# Patient Record
Sex: FEMALE | Race: WHITE | Employment: OTHER | URBAN - METROPOLITAN AREA
[De-identification: names, ages, dates, MRNs, and addresses within clinical notes are randomized per-mention and may not be internally consistent; named-entity substitution may affect disease eponyms.]

---

## 2017-03-10 RX ORDER — ZOLPIDEM TARTRATE 5 MG/1
TABLET ORAL
Qty: 90 TABLET | Refills: 0 | Status: SHIPPED | COMMUNITY
Start: 2017-03-10 | End: 2017-06-10

## 2017-06-01 RX ORDER — VALSARTAN AND HYDROCHLOROTHIAZIDE 160; 25 MG/1; MG/1
TABLET ORAL
Qty: 90 TABLET | Refills: 0 | Status: SHIPPED | OUTPATIENT
Start: 2017-06-01 | End: 2017-08-26

## 2017-06-12 RX ORDER — ZOLPIDEM TARTRATE 5 MG/1
TABLET ORAL
Qty: 90 TABLET | Refills: 0 | Status: SHIPPED | COMMUNITY
Start: 2017-06-12 | End: 2017-09-12

## 2017-07-21 ENCOUNTER — OFFICE VISIT (OUTPATIENT)
Dept: FAMILY MEDICINE CLINIC | Facility: CLINIC | Age: 77
End: 2017-07-21

## 2017-07-21 VITALS
SYSTOLIC BLOOD PRESSURE: 136 MMHG | RESPIRATION RATE: 16 BRPM | TEMPERATURE: 98 F | BODY MASS INDEX: 26.38 KG/M2 | OXYGEN SATURATION: 97 % | DIASTOLIC BLOOD PRESSURE: 74 MMHG | HEART RATE: 75 BPM | HEIGHT: 60 IN | WEIGHT: 134.38 LBS

## 2017-07-21 DIAGNOSIS — I10 ESSENTIAL HYPERTENSION: Primary | ICD-10-CM

## 2017-07-21 DIAGNOSIS — E55.9 VITAMIN D DEFICIENCY: ICD-10-CM

## 2017-07-21 PROCEDURE — 99213 OFFICE O/P EST LOW 20 MIN: CPT | Performed by: FAMILY MEDICINE

## 2017-07-21 RX ORDER — CHOLECALCIFEROL (VITAMIN D3) 50 MCG
TABLET ORAL
COMMUNITY
End: 2019-11-05

## 2017-07-21 NOTE — PROGRESS NOTES
Recently while at her GI specialist office she was told that her blood pressure was elevated.   She has known hypertension usually well-controlled and well-tolerated with losartan HCTZ 100/25 of pressure they discovered at this office was 137/78 and today's

## 2017-07-24 ENCOUNTER — PATIENT MESSAGE (OUTPATIENT)
Dept: FAMILY MEDICINE CLINIC | Facility: CLINIC | Age: 77
End: 2017-07-24

## 2017-07-24 NOTE — TELEPHONE ENCOUNTER
From: Angeles Griffith  To: Viviana Melendez DO  Sent: 7/24/2017 10:59 AM CDT  Subject: Non-Urgent Medical Question    When did I have my last a tetanus shot. I can't find the info in this new My Chart.  How long does the shot last?

## 2017-07-25 ENCOUNTER — LAB ENCOUNTER (OUTPATIENT)
Dept: LAB | Age: 77
End: 2017-07-25
Attending: FAMILY MEDICINE
Payer: MEDICARE

## 2017-07-25 DIAGNOSIS — E55.9 VITAMIN D DEFICIENCY: ICD-10-CM

## 2017-07-25 DIAGNOSIS — I10 ESSENTIAL HYPERTENSION: ICD-10-CM

## 2017-07-25 LAB
25-HYDROXYVITAMIN D (TOTAL): 107.8 NG/ML (ref 30–100)
ALBUMIN SERPL-MCNC: 3.6 G/DL (ref 3.5–4.8)
ALP LIVER SERPL-CCNC: 114 U/L (ref 55–142)
ALT SERPL-CCNC: 34 U/L (ref 14–54)
AST SERPL-CCNC: 26 U/L (ref 15–41)
BASOPHILS # BLD AUTO: 0.05 X10(3) UL (ref 0–0.1)
BASOPHILS NFR BLD AUTO: 0.6 %
BILIRUB SERPL-MCNC: 0.5 MG/DL (ref 0.1–2)
BUN BLD-MCNC: 24 MG/DL (ref 8–20)
CALCIUM BLD-MCNC: 9.3 MG/DL (ref 8.3–10.3)
CHLORIDE: 103 MMOL/L (ref 101–111)
CHOLEST SMN-MCNC: 282 MG/DL (ref ?–200)
CO2: 30 MMOL/L (ref 22–32)
CREAT BLD-MCNC: 1.06 MG/DL (ref 0.55–1.02)
EOSINOPHIL # BLD AUTO: 0.28 X10(3) UL (ref 0–0.3)
EOSINOPHIL NFR BLD AUTO: 3.6 %
ERYTHROCYTE [DISTWIDTH] IN BLOOD BY AUTOMATED COUNT: 13.5 % (ref 11.5–16)
GLUCOSE BLD-MCNC: 87 MG/DL (ref 70–99)
HCT VFR BLD AUTO: 39.7 % (ref 34–50)
HDLC SERPL-MCNC: 109 MG/DL (ref 45–?)
HDLC SERPL: 2.59 {RATIO} (ref ?–4.44)
HGB BLD-MCNC: 13.2 G/DL (ref 12–16)
IMMATURE GRANULOCYTE COUNT: 0.02 X10(3) UL (ref 0–1)
IMMATURE GRANULOCYTE RATIO %: 0.3 %
LDLC SERPL CALC-MCNC: 150 MG/DL (ref ?–130)
LDLC SERPL-MCNC: 23 MG/DL (ref 5–40)
LYMPHOCYTES # BLD AUTO: 3.22 X10(3) UL (ref 0.9–4)
LYMPHOCYTES NFR BLD AUTO: 40.9 %
M PROTEIN MFR SERPL ELPH: 7.6 G/DL (ref 6.1–8.3)
MCH RBC QN AUTO: 30.1 PG (ref 27–33.2)
MCHC RBC AUTO-ENTMCNC: 33.2 G/DL (ref 31–37)
MCV RBC AUTO: 90.6 FL (ref 81–100)
MONOCYTES # BLD AUTO: 0.64 X10(3) UL (ref 0.1–0.6)
MONOCYTES NFR BLD AUTO: 8.1 %
NEUTROPHIL ABS PRELIM: 3.67 X10 (3) UL (ref 1.3–6.7)
NEUTROPHILS # BLD AUTO: 3.67 X10(3) UL (ref 1.3–6.7)
NEUTROPHILS NFR BLD AUTO: 46.5 %
NONHDLC SERPL-MCNC: 173 MG/DL (ref ?–130)
PLATELET # BLD AUTO: 218 10(3)UL (ref 150–450)
POTASSIUM SERPL-SCNC: 3.6 MMOL/L (ref 3.6–5.1)
RBC # BLD AUTO: 4.38 X10(6)UL (ref 3.8–5.1)
RED CELL DISTRIBUTION WIDTH-SD: 45.1 FL (ref 35.1–46.3)
SODIUM SERPL-SCNC: 139 MMOL/L (ref 136–144)
TRIGLYCERIDES: 115 MG/DL (ref ?–150)
TSI SER-ACNC: 3.17 MIU/ML (ref 0.35–5.5)
WBC # BLD AUTO: 7.9 X10(3) UL (ref 4–13)

## 2017-07-25 PROCEDURE — 80053 COMPREHEN METABOLIC PANEL: CPT

## 2017-07-25 PROCEDURE — 85025 COMPLETE CBC W/AUTO DIFF WBC: CPT

## 2017-07-25 PROCEDURE — 82306 VITAMIN D 25 HYDROXY: CPT

## 2017-07-25 PROCEDURE — 36415 COLL VENOUS BLD VENIPUNCTURE: CPT

## 2017-07-25 PROCEDURE — 84443 ASSAY THYROID STIM HORMONE: CPT

## 2017-07-25 PROCEDURE — 80061 LIPID PANEL: CPT

## 2017-08-28 RX ORDER — VALSARTAN AND HYDROCHLOROTHIAZIDE 160; 25 MG/1; MG/1
TABLET ORAL
Qty: 90 TABLET | Refills: 0 | Status: SHIPPED | OUTPATIENT
Start: 2017-08-28 | End: 2017-09-12

## 2017-09-11 RX ORDER — ZOLPIDEM TARTRATE 5 MG/1
TABLET ORAL
Qty: 90 TABLET | Refills: 0 | Status: CANCELLED | COMMUNITY
Start: 2017-09-11

## 2017-09-12 ENCOUNTER — OFFICE VISIT (OUTPATIENT)
Dept: FAMILY MEDICINE CLINIC | Facility: CLINIC | Age: 77
End: 2017-09-12

## 2017-09-12 VITALS
HEIGHT: 60 IN | BODY MASS INDEX: 26.31 KG/M2 | RESPIRATION RATE: 20 BRPM | SYSTOLIC BLOOD PRESSURE: 138 MMHG | DIASTOLIC BLOOD PRESSURE: 80 MMHG | HEART RATE: 82 BPM | WEIGHT: 134 LBS | TEMPERATURE: 99 F

## 2017-09-12 DIAGNOSIS — R42 VERTIGO: ICD-10-CM

## 2017-09-12 DIAGNOSIS — G47.09 OTHER INSOMNIA: ICD-10-CM

## 2017-09-12 DIAGNOSIS — I10 ESSENTIAL HYPERTENSION: ICD-10-CM

## 2017-09-12 DIAGNOSIS — E56.9 VITAMIN DEFICIENCY: ICD-10-CM

## 2017-09-12 DIAGNOSIS — M81.8 OTHER OSTEOPOROSIS WITHOUT CURRENT PATHOLOGICAL FRACTURE: ICD-10-CM

## 2017-09-12 DIAGNOSIS — E78.00 ELEVATED CHOLESTEROL: ICD-10-CM

## 2017-09-12 DIAGNOSIS — Z13.31 DEPRESSION SCREENING: ICD-10-CM

## 2017-09-12 DIAGNOSIS — K21.00 GASTROESOPHAGEAL REFLUX DISEASE WITH ESOPHAGITIS: ICD-10-CM

## 2017-09-12 DIAGNOSIS — Z85.828 HISTORY OF SKIN CANCER: ICD-10-CM

## 2017-09-12 DIAGNOSIS — L71.9 ROSACEA: ICD-10-CM

## 2017-09-12 DIAGNOSIS — B00.9 HSV INFECTION: ICD-10-CM

## 2017-09-12 DIAGNOSIS — Z00.00 ENCOUNTER FOR ANNUAL HEALTH EXAMINATION: Primary | ICD-10-CM

## 2017-09-12 DIAGNOSIS — E55.9 VITAMIN D DEFICIENCY: ICD-10-CM

## 2017-09-12 DIAGNOSIS — G89.29 CHRONIC BACK PAIN, UNSPECIFIED BACK LOCATION, UNSPECIFIED BACK PAIN LATERALITY: ICD-10-CM

## 2017-09-12 DIAGNOSIS — G89.29 CHRONIC RIGHT SHOULDER PAIN: ICD-10-CM

## 2017-09-12 DIAGNOSIS — M25.511 CHRONIC RIGHT SHOULDER PAIN: ICD-10-CM

## 2017-09-12 DIAGNOSIS — M54.9 CHRONIC BACK PAIN, UNSPECIFIED BACK LOCATION, UNSPECIFIED BACK PAIN LATERALITY: ICD-10-CM

## 2017-09-12 PROCEDURE — G0444 DEPRESSION SCREEN ANNUAL: HCPCS | Performed by: FAMILY MEDICINE

## 2017-09-12 PROCEDURE — G0439 PPPS, SUBSEQ VISIT: HCPCS | Performed by: FAMILY MEDICINE

## 2017-09-12 PROCEDURE — 99213 OFFICE O/P EST LOW 20 MIN: CPT | Performed by: FAMILY MEDICINE

## 2017-09-12 RX ORDER — MAGNESIUM OXIDE 400 MG (241.3 MG MAGNESIUM) TABLET
400 TABLET
COMMUNITY

## 2017-09-12 RX ORDER — ZOLPIDEM TARTRATE 5 MG/1
TABLET ORAL
Qty: 90 TABLET | Refills: 3 | Status: SHIPPED | OUTPATIENT
Start: 2017-09-12 | End: 2018-06-09

## 2017-09-12 RX ORDER — VALSARTAN AND HYDROCHLOROTHIAZIDE 160; 25 MG/1; MG/1
1 TABLET ORAL DAILY
Qty: 90 TABLET | Refills: 3 | Status: SHIPPED | OUTPATIENT
Start: 2017-09-12 | End: 2018-09-14

## 2017-09-12 NOTE — PROGRESS NOTES
HPI:   Johnnie Ventura is a 68year old female who presents for a Medicare Subsequent Annual Wellness visit (Pt already had Initial Annual Wellness).     Pt also here to follow up on insomnia, HTN, rosacea   Annual Physical due on 09/14/2017        Quin Peres ONE TABLET BY MOUTH AT BEDTIME AS NEEDED FOR SLEEP   Cholecalciferol (VITAMIN D) 2000 units Oral Tab Take by mouth. MedroxyPROGESTERone Acetate (PROVERA) 2.5 MG Oral Tab Take one tablet by mouth daily. Calcium 500-125 MG-UNIT Oral Tab Take  by mouth. anxiety  HEMATOLOGIC: denies hx of anemia  ENDOCRINE: denies thyroid history  ALL/ASTHMA: denies asthma    EXAM:   /80   Pulse 82   Temp 98.8 °F (37.1 °C) (Oral)   Resp 20   Ht 60\"   Wt 134 lb   LMP 11/10/2013   BMI 26.17 kg/m²  Estimated body mass Immunization History   Administered Date(s) Administered   • Depo-Medrol 40mg Inj 04/07/2014   • TD 05/08/2013       ASSESSMENT AND OTHER RELEVANT CHRONIC CONDITIONS:   Gold Reyes is a 68year old female who presents for a Medicare Assessment. therapy.    Veryl Corson is unable to use daily aspirin therapy For the following reasons:   Patient decided that the risks of aspirin therapy outweigh the benefits and declines aspirin therapy            Diet assessment: good     Advanced Directive:  L No    Problems with daily activities? : No    Memory Problems?: No      Fall/Risk Assessment     Do you have 3 or more medical conditions?: 0-No    Have you fallen in the last 12 months?: 0-No    Do you accidently lose urine?: 0-No    Do you have difficult Cholesterol (mg/dL)   Date Value   07/25/2017 150 (H)        EKG - w/ Initial Preventative Physical Exam only, or if medically necessary Electrocardiogram date05/05/2015       Colorectal Cancer Screening      Colonoscopy Screen every 10 years There are no Illicit injectable drug abusers     Tetanus Toxoid  Only covered with a cut with metal- TD and TDaP Not covered by Medicare Part B 05/08/2013 This may be covered with your prescription benefits, but Medicare does not cover unless Medically needed    Zost

## 2017-09-12 NOTE — PATIENT INSTRUCTIONS
Mode Azevedo's SCREENING SCHEDULE   Tests on this list are recommended by your physician but may not be covered, or covered at this frequency, by your insurer. Please check with your insurance carrier before scheduling to verify coverage.    NATY Abdominal aortic aneurysm screening (once between ages 73-68) IPPE only No results found for this or any previous visit.  Limited to patients who meet one of the following criteria:   • Men who are 73-68 years old and have smoked more than 100 cigarettes Mammogram    Recommend Annually to at least age 76, and as needed after 76 Mammogram,1 Yr due on 09/20/2017 Please get this Mammogram regularly   Immunizations      Influenza  Covered Annually No orders found for this or any previous visit.  Please get [place])  [email or web address]  This link also has information from the [address] [place] [address] regarding Advance Directives.

## 2017-12-22 ENCOUNTER — OFFICE VISIT (OUTPATIENT)
Dept: FAMILY MEDICINE CLINIC | Facility: CLINIC | Age: 77
End: 2017-12-22

## 2017-12-22 VITALS
RESPIRATION RATE: 14 BRPM | DIASTOLIC BLOOD PRESSURE: 78 MMHG | SYSTOLIC BLOOD PRESSURE: 116 MMHG | TEMPERATURE: 98 F | OXYGEN SATURATION: 98 % | HEART RATE: 76 BPM

## 2017-12-22 DIAGNOSIS — R05.9 COUGH: Primary | ICD-10-CM

## 2017-12-22 DIAGNOSIS — J02.9 SORE THROAT: ICD-10-CM

## 2017-12-22 PROCEDURE — 99213 OFFICE O/P EST LOW 20 MIN: CPT | Performed by: FAMILY MEDICINE

## 2017-12-22 RX ORDER — AZITHROMYCIN 250 MG/1
TABLET, FILM COATED ORAL
Qty: 6 TABLET | Refills: 0 | Status: SHIPPED | OUTPATIENT
Start: 2017-12-22 | End: 2018-09-14

## 2017-12-22 NOTE — PROGRESS NOTES
HPI:    Patient ID: Phil Spencer is a 68year old female. Patient presents with:  Cough      HPI  Patient is here for sore throat and cough for 4 days. States cough is productive of Yellowish sputum. Has chest tightness. No difficulty breathing.  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   Nose: Right sinus exhibits no maxillary sinus tenderness and no frontal sinus tenderness. Left sinus exhibits no maxillary sinus tenderness and no frontal sinus tenderness.    Mouth/Throat: Oropharynx is clear and moist.

## 2018-01-19 ENCOUNTER — APPOINTMENT (OUTPATIENT)
Dept: LAB | Age: 78
End: 2018-01-19
Attending: FAMILY MEDICINE
Payer: MEDICARE

## 2018-01-19 DIAGNOSIS — Z12.11 SCREEN FOR COLON CANCER: ICD-10-CM

## 2018-01-19 DIAGNOSIS — K59.00 CONSTIPATION, UNSPECIFIED CONSTIPATION TYPE: ICD-10-CM

## 2018-01-19 DIAGNOSIS — E78.00 ELEVATED CHOLESTEROL: ICD-10-CM

## 2018-01-19 DIAGNOSIS — E55.9 VITAMIN D DEFICIENCY: ICD-10-CM

## 2018-01-19 LAB
25-HYDROXYVITAMIN D (TOTAL): 59.2 NG/ML (ref 30–100)
ALBUMIN SERPL-MCNC: 3.4 G/DL (ref 3.5–4.8)
ALP LIVER SERPL-CCNC: 140 U/L (ref 55–142)
ALT SERPL-CCNC: 33 U/L (ref 14–54)
AST SERPL-CCNC: 25 U/L (ref 15–41)
BILIRUB SERPL-MCNC: 0.5 MG/DL (ref 0.1–2)
BUN BLD-MCNC: 20 MG/DL (ref 8–20)
CALCIUM BLD-MCNC: 8.8 MG/DL (ref 8.3–10.3)
CHLORIDE: 104 MMOL/L (ref 101–111)
CHOLEST SMN-MCNC: 236 MG/DL (ref ?–200)
CO2: 31 MMOL/L (ref 22–32)
CREAT BLD-MCNC: 0.92 MG/DL (ref 0.55–1.02)
GLUCOSE BLD-MCNC: 86 MG/DL (ref 70–99)
HDLC SERPL-MCNC: 90 MG/DL (ref 45–?)
HDLC SERPL: 2.62 {RATIO} (ref ?–4.44)
LDLC SERPL CALC-MCNC: 115 MG/DL (ref ?–130)
M PROTEIN MFR SERPL ELPH: 8 G/DL (ref 6.1–8.3)
NONHDLC SERPL-MCNC: 146 MG/DL (ref ?–130)
POTASSIUM SERPL-SCNC: 4.3 MMOL/L (ref 3.6–5.1)
SODIUM SERPL-SCNC: 139 MMOL/L (ref 136–144)
TRIGL SERPL-MCNC: 153 MG/DL (ref ?–150)
VLDLC SERPL CALC-MCNC: 31 MG/DL (ref 5–40)

## 2018-01-19 PROCEDURE — 84439 ASSAY OF FREE THYROXINE: CPT

## 2018-01-19 PROCEDURE — 80061 LIPID PANEL: CPT

## 2018-01-19 PROCEDURE — 36415 COLL VENOUS BLD VENIPUNCTURE: CPT

## 2018-01-19 PROCEDURE — 84443 ASSAY THYROID STIM HORMONE: CPT

## 2018-01-19 PROCEDURE — 82306 VITAMIN D 25 HYDROXY: CPT

## 2018-01-19 PROCEDURE — 80053 COMPREHEN METABOLIC PANEL: CPT

## 2018-01-22 LAB
FREE T4: 1.1 NG/DL (ref 0.9–1.8)
TSI SER-ACNC: 2.78 MIU/ML (ref 0.35–5.5)

## 2018-03-05 ENCOUNTER — HOSPITAL ENCOUNTER (OUTPATIENT)
Dept: MAMMOGRAPHY | Age: 78
Discharge: HOME | End: 2018-03-05
Attending: OBSTETRICS & GYNECOLOGY
Payer: MEDICARE

## 2018-03-05 DIAGNOSIS — Z12.31 ENCOUNTER FOR SCREENING MAMMOGRAM FOR MALIGNANT NEOPLASM OF BREAST: ICD-10-CM

## 2018-03-05 PROCEDURE — 77067 SCR MAMMO BI INCL CAD: CPT | Performed by: OBSTETRICS & GYNECOLOGY

## 2018-06-05 ENCOUNTER — PATIENT OUTREACH (OUTPATIENT)
Dept: FAMILY MEDICINE CLINIC | Facility: CLINIC | Age: 78
End: 2018-06-05

## 2018-06-09 DIAGNOSIS — G47.09 OTHER INSOMNIA: ICD-10-CM

## 2018-06-11 RX ORDER — ZOLPIDEM TARTRATE 5 MG/1
TABLET ORAL
Qty: 90 TABLET | Refills: 0 | Status: SHIPPED | OUTPATIENT
Start: 2018-06-11 | End: 2018-09-14

## 2018-06-12 ENCOUNTER — PATIENT MESSAGE (OUTPATIENT)
Dept: FAMILY MEDICINE CLINIC | Facility: CLINIC | Age: 78
End: 2018-06-12

## 2018-06-12 NOTE — TELEPHONE ENCOUNTER
From: Ricki Moya  To: Jese Leon DO  Sent: 6/12/2018 11:17 AM CDT  Subject: Other    I will request an appointment for my annual checkup in July as my annual is due after September 12, 2018.    Thank you for the reminder  Jermaine Molina

## 2018-07-24 ENCOUNTER — PATIENT OUTREACH (OUTPATIENT)
Dept: FAMILY MEDICINE CLINIC | Facility: CLINIC | Age: 78
End: 2018-07-24

## 2018-09-01 ENCOUNTER — TELEPHONE (OUTPATIENT)
Dept: FAMILY MEDICINE CLINIC | Facility: CLINIC | Age: 78
End: 2018-09-01

## 2018-09-01 RX ORDER — LOSARTAN POTASSIUM 50 MG/1
50 TABLET ORAL DAILY
Qty: 30 TABLET | Refills: 0 | Status: SHIPPED | OUTPATIENT
Start: 2018-09-01 | End: 2018-09-14

## 2018-09-01 NOTE — TELEPHONE ENCOUNTER
Pt went to pharmacy to  losartan and was advised it was on recall. She wants to know what she should do?

## 2018-09-01 NOTE — TELEPHONE ENCOUNTER
Per Dr.R aSms, plain Losartan 50 mg daily has been  Ordered   Task is done. Patient has been informed of change.

## 2018-09-01 NOTE — TELEPHONE ENCOUNTER
Patient took her last Valsartan 160/25 mg this morning    She is now worried as pharmacy has informed her of recall. Please advise    Thank you. Cr Linder

## 2018-09-04 RX ORDER — HYDROCHLOROTHIAZIDE 25 MG/1
25 TABLET ORAL DAILY
Qty: 30 TABLET | Refills: 1 | Status: SHIPPED | OUTPATIENT
Start: 2018-09-04 | End: 2018-09-14

## 2018-09-14 ENCOUNTER — OFFICE VISIT (OUTPATIENT)
Dept: FAMILY MEDICINE CLINIC | Facility: CLINIC | Age: 78
End: 2018-09-14
Payer: MEDICARE

## 2018-09-14 VITALS
RESPIRATION RATE: 16 BRPM | DIASTOLIC BLOOD PRESSURE: 74 MMHG | TEMPERATURE: 98 F | BODY MASS INDEX: 26.7 KG/M2 | HEIGHT: 60 IN | HEART RATE: 76 BPM | SYSTOLIC BLOOD PRESSURE: 136 MMHG | WEIGHT: 136 LBS

## 2018-09-14 DIAGNOSIS — B00.9 HSV INFECTION: ICD-10-CM

## 2018-09-14 DIAGNOSIS — E53.8 VITAMIN B12 DEFICIENCY: ICD-10-CM

## 2018-09-14 DIAGNOSIS — G89.29 CHRONIC LOW BACK PAIN WITH SCIATICA, SCIATICA LATERALITY UNSPECIFIED, UNSPECIFIED BACK PAIN LATERALITY: ICD-10-CM

## 2018-09-14 DIAGNOSIS — E56.9 VITAMIN DEFICIENCY: ICD-10-CM

## 2018-09-14 DIAGNOSIS — Z00.00 ENCOUNTER FOR ANNUAL HEALTH EXAMINATION: Primary | ICD-10-CM

## 2018-09-14 DIAGNOSIS — G47.09 OTHER INSOMNIA: ICD-10-CM

## 2018-09-14 DIAGNOSIS — L71.9 ROSACEA: ICD-10-CM

## 2018-09-14 DIAGNOSIS — K83.01 PRIMARY SCLEROSING CHOLANGITIS: ICD-10-CM

## 2018-09-14 DIAGNOSIS — M54.40 CHRONIC LOW BACK PAIN WITH SCIATICA, SCIATICA LATERALITY UNSPECIFIED, UNSPECIFIED BACK PAIN LATERALITY: ICD-10-CM

## 2018-09-14 DIAGNOSIS — K21.00 GASTROESOPHAGEAL REFLUX DISEASE WITH ESOPHAGITIS: ICD-10-CM

## 2018-09-14 DIAGNOSIS — Z85.828 HISTORY OF SKIN CANCER: ICD-10-CM

## 2018-09-14 DIAGNOSIS — E78.00 ELEVATED CHOLESTEROL: ICD-10-CM

## 2018-09-14 DIAGNOSIS — E55.9 VITAMIN D DEFICIENCY: ICD-10-CM

## 2018-09-14 DIAGNOSIS — Z13.820 SCREENING FOR OSTEOPOROSIS: ICD-10-CM

## 2018-09-14 DIAGNOSIS — Z13.31 DEPRESSION SCREENING: ICD-10-CM

## 2018-09-14 DIAGNOSIS — M81.8 OTHER OSTEOPOROSIS WITHOUT CURRENT PATHOLOGICAL FRACTURE: ICD-10-CM

## 2018-09-14 DIAGNOSIS — Z78.0 ASYMPTOMATIC MENOPAUSE: ICD-10-CM

## 2018-09-14 DIAGNOSIS — I10 ESSENTIAL HYPERTENSION: ICD-10-CM

## 2018-09-14 PROCEDURE — 99213 OFFICE O/P EST LOW 20 MIN: CPT | Performed by: FAMILY MEDICINE

## 2018-09-14 PROCEDURE — G0439 PPPS, SUBSEQ VISIT: HCPCS | Performed by: FAMILY MEDICINE

## 2018-09-14 PROCEDURE — G0444 DEPRESSION SCREEN ANNUAL: HCPCS | Performed by: FAMILY MEDICINE

## 2018-09-14 RX ORDER — LOSARTAN POTASSIUM 50 MG/1
50 TABLET ORAL DAILY
Qty: 90 TABLET | Refills: 3 | Status: SHIPPED | OUTPATIENT
Start: 2018-09-14 | End: 2019-04-04

## 2018-09-14 RX ORDER — HYDROCHLOROTHIAZIDE 25 MG/1
25 TABLET ORAL DAILY
Qty: 90 TABLET | Refills: 3 | Status: SHIPPED | OUTPATIENT
Start: 2018-09-14 | End: 2019-09-07

## 2018-09-14 RX ORDER — ZOLPIDEM TARTRATE 5 MG/1
TABLET ORAL
Qty: 90 TABLET | Refills: 3 | Status: SHIPPED | OUTPATIENT
Start: 2018-09-14 | End: 2019-03-25

## 2018-09-14 NOTE — PATIENT INSTRUCTIONS
Beto Azevedo's SCREENING SCHEDULE   Tests on this list are recommended by your physician but may not be covered, or covered at this frequency, by your insurer. Please check with your insurance carrier before scheduling to verify coverage.    NATY screening (once between ages 73-68) IPPE only No results found for this or any previous visit.  Limited to patients who meet one of the following criteria:   • Men who are 73-68 years old and have smoked more than 100 cigarettes in their lifetime   • Anyone Annually to at least age 76, and as needed after 76 There are no preventive care reminders to display for this patient. Please get this Mammogram regularly   Immunizations      Influenza  Covered Annually No orders found for this or any previous visit.  Ple available in [address])  [email or web address]  This link also has information from the [address] [place] [address] regarding Advance Directives.

## 2018-09-14 NOTE — PROGRESS NOTES
HPI:   Kim Fitzgerald is a 66year old female who presents for a Medicare Subsequent Annual Wellness visit (Pt already had Initial Annual Wellness).     Pt also here to follow up on   Rosacea     Vitamin deficiency     Essential hypertension     HSV inf (if present), and forms available to patient in AVS         She has never smoked tobacco.    CAGE Alcohol screening   Daniella Reeves was screened for Alcohol abuse and had a score of 0 so is at low risk.     Patient Care Team: Patient Care Team:  Verona Coronado, tablet (25 mg total) by mouth daily. Zolpidem Tartrate 5 MG Oral Tab TAKE ONE TABLET BY MOUTH AT BEDTIME AS NEEDED FOR SLEEP   Estradiol 0.025 MG/24HR Transdermal Patch Biweekly Place 1 patch onto the skin twice a week.    Progesterone Micronized 100 MG O exertion  CARDIOVASCULAR: denies chest pain on exertion  GI: denies abdominal pain, denies heartburn  : denies dysuria, vaginal discharge or itching, no complaint of urinary incontinence   MUSCULOSKELETAL: denies back pain  NEURO: denies headaches  PSYCH cyanosis or edema   Pulses: 2+ and symmetric   Skin: Skin color, texture, turgor normal, no rashes or lesions   Lymph nodes: Cervical, supraclavicular, and axillary nodes normal   Neurologic: Normal       Vaccination History     Immunization History   Admi III    Rosacea- stable monitor   -     OFFICE/OUTPT VISIT,EST,LEVL III    Vitamin deficiency - labs due   -     VITAMIN B12; Future  -     VITAMIN D, 25-HYDROXY; Future  -     OFFICE/OUTPT VISIT,EST,LEVL III    Primary sclerosing cholangitis- sees gi regul Value   04/05/2011 116     LDL CHOLESTROL (mg/dL)   Date Value   10/25/2013 129     LDL Cholesterol (mg/dL)   Date Value   01/19/2018 115        EKG - w/ Initial Preventative Physical Exam only, or if medically necessary Electrocardiogram date       [facility]   Persons who live in the same house as a HepB virus carrier   Homosexual men   Illicit injectable drug abusers     Tetanus Toxoid  Only covered with a cut with metal- TD and TDaP Not covered by Medicare Part B 05/08/

## 2018-09-16 PROBLEM — K83.01 PRIMARY SCLEROSING CHOLANGITIS: Status: ACTIVE | Noted: 2018-09-16

## 2018-09-25 ENCOUNTER — LAB ENCOUNTER (OUTPATIENT)
Dept: LAB | Age: 78
End: 2018-09-25
Attending: FAMILY MEDICINE
Payer: MEDICARE

## 2018-09-25 DIAGNOSIS — E56.9 VITAMIN DEFICIENCY: ICD-10-CM

## 2018-09-25 DIAGNOSIS — I10 ESSENTIAL HYPERTENSION: ICD-10-CM

## 2018-09-25 DIAGNOSIS — E53.8 VITAMIN B12 DEFICIENCY: ICD-10-CM

## 2018-09-25 DIAGNOSIS — K21.00 GASTROESOPHAGEAL REFLUX DISEASE WITH ESOPHAGITIS: ICD-10-CM

## 2018-09-25 DIAGNOSIS — E55.9 VITAMIN D DEFICIENCY: ICD-10-CM

## 2018-09-25 LAB
ALBUMIN SERPL-MCNC: 3.6 G/DL (ref 3.5–4.8)
ALBUMIN/GLOB SERPL: 0.9 {RATIO} (ref 1–2)
ALP LIVER SERPL-CCNC: 152 U/L (ref 55–142)
ALT SERPL-CCNC: 61 U/L (ref 14–54)
ANION GAP SERPL CALC-SCNC: 5 MMOL/L (ref 0–18)
AST SERPL-CCNC: 38 U/L (ref 15–41)
BASOPHILS # BLD AUTO: 0.07 X10(3) UL (ref 0–0.1)
BASOPHILS NFR BLD AUTO: 0.8 %
BILIRUB SERPL-MCNC: 0.5 MG/DL (ref 0.1–2)
BUN BLD-MCNC: 25 MG/DL (ref 8–20)
BUN/CREAT SERPL: 26.6 (ref 10–20)
CALCIUM BLD-MCNC: 9.8 MG/DL (ref 8.3–10.3)
CHLORIDE SERPL-SCNC: 101 MMOL/L (ref 101–111)
CHOLEST SMN-MCNC: 299 MG/DL (ref ?–200)
CO2 SERPL-SCNC: 32 MMOL/L (ref 22–32)
CREAT BLD-MCNC: 0.94 MG/DL (ref 0.55–1.02)
EOSINOPHIL # BLD AUTO: 0.25 X10(3) UL (ref 0–0.3)
EOSINOPHIL NFR BLD AUTO: 2.8 %
ERYTHROCYTE [DISTWIDTH] IN BLOOD BY AUTOMATED COUNT: 13 % (ref 11.5–16)
GLOBULIN PLAS-MCNC: 4.1 G/DL (ref 2.5–4)
GLUCOSE BLD-MCNC: 96 MG/DL (ref 70–99)
HAV AB SERPL IA-ACNC: 1575 PG/ML (ref 193–986)
HCT VFR BLD AUTO: 39.9 % (ref 34–50)
HDLC SERPL-MCNC: 101 MG/DL (ref 40–59)
HGB BLD-MCNC: 13.3 G/DL (ref 12–16)
IMMATURE GRANULOCYTE COUNT: 0.01 X10(3) UL (ref 0–1)
IMMATURE GRANULOCYTE RATIO %: 0.1 %
LDLC SERPL CALC-MCNC: 182 MG/DL (ref ?–100)
LYMPHOCYTES # BLD AUTO: 3.65 X10(3) UL (ref 0.9–4)
LYMPHOCYTES NFR BLD AUTO: 41.1 %
M PROTEIN MFR SERPL ELPH: 7.7 G/DL (ref 6.1–8.3)
MCH RBC QN AUTO: 30.4 PG (ref 27–33.2)
MCHC RBC AUTO-ENTMCNC: 33.3 G/DL (ref 31–37)
MCV RBC AUTO: 91.3 FL (ref 81–100)
MONOCYTES # BLD AUTO: 0.63 X10(3) UL (ref 0.1–1)
MONOCYTES NFR BLD AUTO: 7.1 %
NEUTROPHIL ABS PRELIM: 4.26 X10 (3) UL (ref 1.3–6.7)
NEUTROPHILS # BLD AUTO: 4.26 X10(3) UL (ref 1.3–6.7)
NEUTROPHILS NFR BLD AUTO: 48.1 %
NONHDLC SERPL-MCNC: 198 MG/DL (ref ?–130)
OSMOLALITY SERPL CALC.SUM OF ELEC: 290 MOSM/KG (ref 275–295)
PLATELET # BLD AUTO: 287 10(3)UL (ref 150–450)
POTASSIUM SERPL-SCNC: 3.8 MMOL/L (ref 3.6–5.1)
RBC # BLD AUTO: 4.37 X10(6)UL (ref 3.8–5.1)
RED CELL DISTRIBUTION WIDTH-SD: 43.9 FL (ref 35.1–46.3)
SODIUM SERPL-SCNC: 138 MMOL/L (ref 136–144)
T4 FREE SERPL-MCNC: 1 NG/DL (ref 0.9–1.8)
TRIGL SERPL-MCNC: 81 MG/DL (ref 30–149)
TSI SER-ACNC: 2.07 MIU/ML (ref 0.35–5.5)
VIT D+METAB SERPL-MCNC: 63.3 NG/ML (ref 30–100)
VLDLC SERPL CALC-MCNC: 16 MG/DL (ref 0–30)
WBC # BLD AUTO: 8.9 X10(3) UL (ref 4–13)

## 2018-09-25 PROCEDURE — 80053 COMPREHEN METABOLIC PANEL: CPT

## 2018-09-25 PROCEDURE — 36415 COLL VENOUS BLD VENIPUNCTURE: CPT

## 2018-09-25 PROCEDURE — 82607 VITAMIN B-12: CPT

## 2018-09-25 PROCEDURE — 85025 COMPLETE CBC W/AUTO DIFF WBC: CPT

## 2018-09-25 PROCEDURE — 84439 ASSAY OF FREE THYROXINE: CPT

## 2018-09-25 PROCEDURE — 84443 ASSAY THYROID STIM HORMONE: CPT

## 2018-09-25 PROCEDURE — 80061 LIPID PANEL: CPT

## 2018-09-25 PROCEDURE — 82306 VITAMIN D 25 HYDROXY: CPT

## 2018-09-27 ENCOUNTER — TELEPHONE (OUTPATIENT)
Dept: FAMILY MEDICINE CLINIC | Facility: CLINIC | Age: 78
End: 2018-09-27

## 2018-09-27 DIAGNOSIS — E78.00 ELEVATED LDL CHOLESTEROL LEVEL: ICD-10-CM

## 2018-09-27 DIAGNOSIS — R74.8 ELEVATED ALKALINE PHOSPHATASE LEVEL: ICD-10-CM

## 2018-09-27 DIAGNOSIS — R74.01 ELEVATED ALT MEASUREMENT: ICD-10-CM

## 2018-09-27 DIAGNOSIS — R79.9 ELEVATED BUN: Primary | ICD-10-CM

## 2018-09-27 DIAGNOSIS — E78.00 ELEVATED CHOLESTEROL: ICD-10-CM

## 2018-09-27 NOTE — TELEPHONE ENCOUNTER
----- Message from Minerva Jean DO sent at 9/26/2018 11:17 PM CDT -----  LDL has gone up   2 liver markers out of range  Repeat lipids / cmp in 3 months

## 2018-11-30 ENCOUNTER — PATIENT MESSAGE (OUTPATIENT)
Dept: FAMILY MEDICINE CLINIC | Facility: CLINIC | Age: 78
End: 2018-11-30

## 2018-11-30 NOTE — TELEPHONE ENCOUNTER
From: Josiah Rangel  To: Daniela Barrera DO  Sent: 11/30/2018 11:52 AM CST  Subject: Other    I have to follow to get a follow up blood test. Did Dr. Claire Trinidad put the order in for that and can I get this done in [facility]?

## 2019-01-15 ENCOUNTER — HOSPITAL ENCOUNTER (OUTPATIENT)
Dept: GENERAL RADIOLOGY | Age: 79
Discharge: HOME | End: 2019-01-15
Attending: ORTHOPAEDIC SURGERY
Payer: MEDICARE

## 2019-01-15 DIAGNOSIS — M25.552 LEFT HIP PAIN: ICD-10-CM

## 2019-01-15 DIAGNOSIS — M54.32 SCIATIC NERVE PAIN, LEFT: ICD-10-CM

## 2019-01-15 PROCEDURE — 72100 X-RAY EXAM L-S SPINE 2/3 VWS: CPT | Performed by: ORTHOPAEDIC SURGERY

## 2019-03-25 DIAGNOSIS — G47.09 OTHER INSOMNIA: ICD-10-CM

## 2019-03-25 RX ORDER — ZOLPIDEM TARTRATE 5 MG/1
TABLET ORAL
Qty: 90 TABLET | Refills: 3 | Status: SHIPPED | OUTPATIENT
Start: 2019-03-25 | End: 2019-09-17

## 2019-03-26 NOTE — PROGRESS NOTES
LOWER EXTREMITY EVALUATION:   Referring Physician: Dr. Arlette Nj  Diagnosis:   Dx:  Left hip pain (M25.552)  Primary osteoarthritis of left hip (M16.12)        Date of Service: 3/27/2019     PATIENT SUMMARY   Eileen Bales is a 78year old y/o female w improve  ROM prior to strengthening.  Iva Coker would benefit from skilled Physical Therapy to attempt to restore ROM, increase strength, improve balance, improve gait efficiency and address restrictions of soft tissue and musculature in order to alleviate p hydration to reduce co symptomatic OH and dizziness. Patient was instructed in and issued a HEP for: clams 15x2 daily and hip ADD stretch 30 secs 3x 3x daily for LLE.     Charges: 1 there ex PT Eval High Complexity, In agreement with FOTO score and clinic has agreed to actively participate in planning and for this course of care. Thank you for your referral. Please co-sign or sign and return this letter via fax as soon as possible to [phone]  . Tank Garcia  If you have any questions, please contact me at Dept

## 2019-03-27 ENCOUNTER — OFFICE VISIT (OUTPATIENT)
Dept: PHYSICAL THERAPY | Age: 79
End: 2019-03-27
Attending: ORTHOPAEDIC SURGERY
Payer: MEDICARE

## 2019-03-27 DIAGNOSIS — M25.552 LEFT HIP PAIN: ICD-10-CM

## 2019-03-27 DIAGNOSIS — M16.12 PRIMARY OSTEOARTHRITIS OF LEFT HIP: ICD-10-CM

## 2019-03-27 PROCEDURE — 97163 PT EVAL HIGH COMPLEX 45 MIN: CPT

## 2019-03-27 PROCEDURE — 97110 THERAPEUTIC EXERCISES: CPT

## 2019-04-02 ENCOUNTER — APPOINTMENT (OUTPATIENT)
Dept: PHYSICAL THERAPY | Age: 79
End: 2019-04-02
Attending: ORTHOPAEDIC SURGERY
Payer: MEDICARE

## 2019-04-04 ENCOUNTER — OFFICE VISIT (OUTPATIENT)
Dept: FAMILY MEDICINE CLINIC | Facility: CLINIC | Age: 79
End: 2019-04-04
Payer: MEDICARE

## 2019-04-04 VITALS
TEMPERATURE: 98 F | WEIGHT: 146 LBS | OXYGEN SATURATION: 96 % | SYSTOLIC BLOOD PRESSURE: 164 MMHG | HEIGHT: 60 IN | DIASTOLIC BLOOD PRESSURE: 80 MMHG | RESPIRATION RATE: 16 BRPM | HEART RATE: 86 BPM | BODY MASS INDEX: 28.66 KG/M2

## 2019-04-04 DIAGNOSIS — R35.0 FREQUENCY OF URINATION: ICD-10-CM

## 2019-04-04 DIAGNOSIS — M79.652 PAIN OF LEFT THIGH: ICD-10-CM

## 2019-04-04 DIAGNOSIS — I10 ESSENTIAL HYPERTENSION: Primary | ICD-10-CM

## 2019-04-04 PROCEDURE — 87086 URINE CULTURE/COLONY COUNT: CPT | Performed by: FAMILY MEDICINE

## 2019-04-04 PROCEDURE — 81003 URINALYSIS AUTO W/O SCOPE: CPT | Performed by: FAMILY MEDICINE

## 2019-04-04 PROCEDURE — 99213 OFFICE O/P EST LOW 20 MIN: CPT | Performed by: FAMILY MEDICINE

## 2019-04-04 RX ORDER — LOSARTAN POTASSIUM 100 MG/1
100 TABLET ORAL DAILY
Qty: 90 TABLET | Refills: 0 | Status: SHIPPED | OUTPATIENT
Start: 2019-04-04 | End: 2019-07-18

## 2019-04-04 NOTE — PROGRESS NOTES
HPI:   Dunia Duarte is a 78year old female here with bp concerns, urinary issues and left leg pain      Pt c/o high bp at home   Pt has gained weight due to inactivity due to injury in November       3 mo of urinary frequency   No fever   No back pain Medical History:   Diagnosis Date   • Cancer ([facility] [address]     basal cell   • Esophageal reflux    • Hypertension    • Osteoporosis    • Primary biliary cirrhosis (HCC)    • Scoliosis    • Uterine fibroid       Past Surgical History:   Procedure Laterality Date Pulse 86   Resp 16   Wt 146 lb   LMP 11/10/2013   SpO2 96%   BMI 28.51 kg/m²   Body mass index is 28.51 kg/m².    GENERAL: alert and oriented X 3, well developed, well nourished,in no apparent distress  CARDIO: RRR without murmur  LUNGS: clear to auscultati

## 2019-04-05 ENCOUNTER — OFFICE VISIT (OUTPATIENT)
Dept: PHYSICAL THERAPY | Age: 79
End: 2019-04-05
Attending: ORTHOPAEDIC SURGERY
Payer: MEDICARE

## 2019-04-05 DIAGNOSIS — M25.552 LEFT HIP PAIN: ICD-10-CM

## 2019-04-05 DIAGNOSIS — M16.12 PRIMARY OSTEOARTHRITIS OF LEFT HIP: ICD-10-CM

## 2019-04-05 PROCEDURE — 97110 THERAPEUTIC EXERCISES: CPT

## 2019-04-05 PROCEDURE — 97140 MANUAL THERAPY 1/> REGIONS: CPT

## 2019-04-05 NOTE — PROGRESS NOTES
Dx: Dx:  Left hip pain (M25.552) Primary osteoarthritis of left hip (M16.12         Authorized # of Visits:  Medical Necessity                                  Next MD visit: nonescheduled  Fall Risk: standard         Precautions: n/a             Subjective Symptom Modification-Long Axis Distraction (Hip OA): -    Balance: SLS: R 9 sec, L 3 sec      Assessment: Session focused on PROM, manual stretching and pelvic stability training.  Pt requires extensive explanation of each activity due to anxious/fear bas pt throughout         Grade 2-3 mobs for long axis traction with Mulligan belt 5 mins total at 30 secs intervals LLE, pt states it felt good         PT instructs pt in hooklying bridges with cues for stability as pt demos lateral movement B femurs 15x2*

## 2019-04-09 ENCOUNTER — OFFICE VISIT (OUTPATIENT)
Dept: PHYSICAL THERAPY | Age: 79
End: 2019-04-09
Attending: ORTHOPAEDIC SURGERY
Payer: MEDICARE

## 2019-04-09 DIAGNOSIS — M16.12 PRIMARY OSTEOARTHRITIS OF LEFT HIP: ICD-10-CM

## 2019-04-09 DIAGNOSIS — M25.552 LEFT HIP PAIN: ICD-10-CM

## 2019-04-09 PROCEDURE — 97530 THERAPEUTIC ACTIVITIES: CPT

## 2019-04-09 PROCEDURE — 97110 THERAPEUTIC EXERCISES: CPT

## 2019-04-09 PROCEDURE — 97140 MANUAL THERAPY 1/> REGIONS: CPT

## 2019-04-09 NOTE — PROGRESS NOTES
Dx: Dx:  Left hip pain (M25.552) Primary osteoarthritis of left hip (M16.12         Authorized # of Visits:  Medical Necessity                                  Next MD visit: nonescheduled  Fall Risk: standard         Precautions: n/a             Subjective of L hamstring knee contracture as she states she has been sleeping with a pillow under knees since Nov 2018 and pain with knee EXT. PT educates pt on positioning pillow to float heels and NOT under knees as it will increase her hamstring contracture.  She hamstring glut, piriformis, hip flexor 30 secs4x PT administers HEP handout for supine hip flexor * LLE          Grade 2-3 mobs for long axis traction with Mulligan belt 5 mins total at 30 secs intervals LLE, pt states it felt good PT instructs pt in in gonzalez

## 2019-04-10 ENCOUNTER — HOSPITAL ENCOUNTER (OUTPATIENT)
Dept: MRI IMAGING | Age: 79
Discharge: HOME | End: 2019-04-10
Attending: ORTHOPAEDIC SURGERY
Payer: MEDICARE

## 2019-04-10 DIAGNOSIS — M81.8 OTHER OSTEOPOROSIS WITHOUT CURRENT PATHOLOGICAL FRACTURE: ICD-10-CM

## 2019-04-10 PROCEDURE — 73721 MRI JNT OF LWR EXTRE W/O DYE: CPT | Performed by: ORTHOPAEDIC SURGERY

## 2019-04-11 ENCOUNTER — LABORATORY ENCOUNTER (OUTPATIENT)
Dept: LAB | Age: 79
End: 2019-04-11
Attending: FAMILY MEDICINE
Payer: MEDICARE

## 2019-04-11 DIAGNOSIS — R74.01 ELEVATED ALT MEASUREMENT: ICD-10-CM

## 2019-04-11 DIAGNOSIS — R74.8 ELEVATED ALKALINE PHOSPHATASE LEVEL: ICD-10-CM

## 2019-04-11 DIAGNOSIS — R79.9 ELEVATED BUN: ICD-10-CM

## 2019-04-11 DIAGNOSIS — R31.1 BENIGN ESSENTIAL MICROSCOPIC HEMATURIA: ICD-10-CM

## 2019-04-11 DIAGNOSIS — E78.00 ELEVATED LDL CHOLESTEROL LEVEL: ICD-10-CM

## 2019-04-11 DIAGNOSIS — E78.00 ELEVATED CHOLESTEROL: ICD-10-CM

## 2019-04-11 PROCEDURE — 36415 COLL VENOUS BLD VENIPUNCTURE: CPT

## 2019-04-11 PROCEDURE — 80053 COMPREHEN METABOLIC PANEL: CPT

## 2019-04-11 PROCEDURE — 80061 LIPID PANEL: CPT

## 2019-04-15 ENCOUNTER — OFFICE VISIT (OUTPATIENT)
Dept: PHYSICAL THERAPY | Age: 79
End: 2019-04-15
Attending: ORTHOPAEDIC SURGERY
Payer: MEDICARE

## 2019-04-15 DIAGNOSIS — M25.552 LEFT HIP PAIN: ICD-10-CM

## 2019-04-15 DIAGNOSIS — M16.12 PRIMARY OSTEOARTHRITIS OF LEFT HIP: ICD-10-CM

## 2019-04-15 PROCEDURE — 97112 NEUROMUSCULAR REEDUCATION: CPT

## 2019-04-15 PROCEDURE — 97530 THERAPEUTIC ACTIVITIES: CPT

## 2019-04-15 PROCEDURE — 97110 THERAPEUTIC EXERCISES: CPT

## 2019-04-15 NOTE — PROGRESS NOTES
Dx: Dx:  Left hip pain (M25.552) Primary osteoarthritis of left hip (M16.12         Authorized # of Visits:  Medical Necessity                                  Next MD visit: nonescheduled  Fall Risk: standard         Precautions: n/a             Subjective reports   Symptom Modification-Long Axis Distraction (Hip OA): -    Balance: SLS: R 9 sec, L 3 sec      Assessment: Session focused on pt education of anatomy, pain, expected pain, muscle tension vs bony arthritic pain, length of care and length of time fr TX#: 5/ Date:               TX#: 6/ Date:               TX#: 7/ Date:               TX#: 8/   Supine manual PROM L hip, pt demos guarding and requires increased time to allow for passive movement 8 mins  Supine PROM L hip all planes, pt demos red sidelying * 30 secs 4x LLE         PT educates pt on medication safety and consulting MD/pharmacist on medication changes as she states she is no longer taking diclofenac and is now taking advil.  She states she will inform MD and ask additional questions,

## 2019-04-17 ENCOUNTER — OFFICE VISIT (OUTPATIENT)
Dept: PHYSICAL THERAPY | Age: 79
End: 2019-04-17
Attending: ORTHOPAEDIC SURGERY
Payer: MEDICARE

## 2019-04-17 PROCEDURE — 97110 THERAPEUTIC EXERCISES: CPT

## 2019-04-17 NOTE — PROGRESS NOTES
Dx: Dx:  Left hip pain (M25.552) Primary osteoarthritis of left hip (M16.12         Authorized # of Visits:  Medical Necessity                                  Next MD visit: nonescheduled  Fall Risk: standard         Precautions: n/a             Subjective stretching for improved ROM and  Pain.      Goals:   · Pt will have improved hip AROM ABD to 35 deg to be able to don/doff shoes and perform car transfers without difficulty (10 visits)  · Pt will improve hip ABD and ER strength to 3+/5 to increase ease wit questions, PT educates using visual aids from internet.  PT educates pt on ITB anatomy, connections below knee and tension in anterior L leg in \"shin splint\" area impacting arch of foot PT challenges lateral hip strength, ABD and glut med yellow band mid diclofenac and is now taking advil. She states she will inform MD and ask additional questions, PT reinforces to pt that MD has prescribed meds for a reason and she should inform him when making changes to ensure her own safety. She states she will call.

## 2019-04-19 ENCOUNTER — APPOINTMENT (OUTPATIENT)
Dept: PHYSICAL THERAPY | Age: 79
End: 2019-04-19
Attending: ORTHOPAEDIC SURGERY
Payer: MEDICARE

## 2019-04-19 ENCOUNTER — TELEPHONE (OUTPATIENT)
Dept: FAMILY MEDICINE CLINIC | Facility: CLINIC | Age: 79
End: 2019-04-19

## 2019-04-19 DIAGNOSIS — R79.9 ABNORMAL BLOOD CHEMISTRY: Primary | ICD-10-CM

## 2019-04-22 ENCOUNTER — OFFICE VISIT (OUTPATIENT)
Dept: PHYSICAL THERAPY | Age: 79
End: 2019-04-22
Attending: ORTHOPAEDIC SURGERY
Payer: MEDICARE

## 2019-04-22 DIAGNOSIS — M25.552 LEFT HIP PAIN: ICD-10-CM

## 2019-04-22 DIAGNOSIS — M16.12 PRIMARY OSTEOARTHRITIS OF LEFT HIP: ICD-10-CM

## 2019-04-22 PROCEDURE — 97110 THERAPEUTIC EXERCISES: CPT

## 2019-04-22 PROCEDURE — 97140 MANUAL THERAPY 1/> REGIONS: CPT

## 2019-04-22 PROCEDURE — 97035 APP MDLTY 1+ULTRASOUND EA 15: CPT

## 2019-04-22 NOTE — PROGRESS NOTES
Dx: Dx:  Left hip pain (M25.552) Primary osteoarthritis of left hip (M16.12         Authorized # of Visits:  Medical Necessity                                  Next MD visit: nonescheduled  Fall Risk: standard         Precautions: n/a             Subjective (Hip OA): -    Balance: SLS: R 9 sec, L 3 sec      Assessment: Session focused on pt education, STM and manual stretching for hip flexor and quad to alleviate \"mass\" at L hip flexor. Pt states it feels better following US  And STM.     Goals:   · Pt will medication, feeling it is getting worse, PT educates pt that her avoiding activity and not taking presecribed medication will not help her  Pain.  She agrees, but states she was too busy to contact MD. She states she will call alter this week     Manual str stretch arch * PT challenges walking tolerance on treadmill  1.4 mph 4 mins min verbal and visual cues for  Heel toe pattern STM to L hip flexor 8 mins for soft tissue extensibilty     PT instructs pt in seated hamstring stretch for each LE 30 secsc 3-5x 3

## 2019-04-25 NOTE — PROGRESS NOTES
Dx: Dx:  Left hip pain (M25.552) Primary osteoarthritis of left hip (M16.12         Authorized # of Visits:  Medical Necessity                                  Next MD visit: nonescheduled  Fall Risk: standard         Precautions: n/a             Subjective focused on glut strength and hip musculature extensibility. Pt progressing with decreased pain response.   Goals:   · Pt will have improved hip AROM ABD to 35 deg to be able to don/doff shoes and perform car transfers without difficulty (10 visits)  · Pt wi medication will not help her  Pain.  She agrees, but states she was too busy to contact MD. She states she will call alter this week US 8 min 1Mhz sound head   .5cm2  Intensity 1.5 W/cm2 for pain relief and soft tissue extensibilty    Manual stretching E as needed for safety mod tactile cues for biasing gluts vs quads 10x3* PT instructs pt in rolling foot over water bottle to stretch arch * PT challenges walking tolerance on treadmill  1.4 mph 4 mins min verbal and visual cues for  Heel toe pattern STM to feel it, self STM with ball to L ITB and quad *       All exercises marked with * indicate they are included in pt's HEP.   Charges: 1 US 1there ex  1 manual Total Timed Treatment: 45 min  Total Treatment Time: 45 min

## 2019-04-26 ENCOUNTER — OFFICE VISIT (OUTPATIENT)
Dept: PHYSICAL THERAPY | Age: 79
End: 2019-04-26
Attending: ORTHOPAEDIC SURGERY
Payer: MEDICARE

## 2019-04-26 DIAGNOSIS — M25.552 LEFT HIP PAIN: ICD-10-CM

## 2019-04-26 DIAGNOSIS — M16.12 PRIMARY OSTEOARTHRITIS OF LEFT HIP: ICD-10-CM

## 2019-04-26 PROCEDURE — 97140 MANUAL THERAPY 1/> REGIONS: CPT

## 2019-04-26 PROCEDURE — 97035 APP MDLTY 1+ULTRASOUND EA 15: CPT

## 2019-04-26 PROCEDURE — 97110 THERAPEUTIC EXERCISES: CPT

## 2019-04-30 ENCOUNTER — APPOINTMENT (OUTPATIENT)
Dept: LAB | Age: 79
End: 2019-04-30
Attending: FAMILY MEDICINE
Payer: MEDICARE

## 2019-04-30 ENCOUNTER — OFFICE VISIT (OUTPATIENT)
Dept: PHYSICAL THERAPY | Age: 79
End: 2019-04-30
Attending: FAMILY MEDICINE
Payer: MEDICARE

## 2019-04-30 DIAGNOSIS — E78.00 ELEVATED LDL CHOLESTEROL LEVEL: ICD-10-CM

## 2019-04-30 DIAGNOSIS — E78.00 ELEVATED CHOLESTEROL: ICD-10-CM

## 2019-04-30 DIAGNOSIS — R74.8 ELEVATED ALKALINE PHOSPHATASE LEVEL: ICD-10-CM

## 2019-04-30 DIAGNOSIS — R79.9 ABNORMAL BLOOD CHEMISTRY: ICD-10-CM

## 2019-04-30 DIAGNOSIS — R79.9 ELEVATED BUN: ICD-10-CM

## 2019-04-30 DIAGNOSIS — R74.01 ELEVATED ALT MEASUREMENT: ICD-10-CM

## 2019-04-30 DIAGNOSIS — R31.1 BENIGN ESSENTIAL MICROSCOPIC HEMATURIA: ICD-10-CM

## 2019-04-30 PROCEDURE — 81001 URINALYSIS AUTO W/SCOPE: CPT

## 2019-04-30 PROCEDURE — 80053 COMPREHEN METABOLIC PANEL: CPT

## 2019-04-30 PROCEDURE — 97110 THERAPEUTIC EXERCISES: CPT

## 2019-04-30 PROCEDURE — 36415 COLL VENOUS BLD VENIPUNCTURE: CPT

## 2019-04-30 PROCEDURE — 97035 APP MDLTY 1+ULTRASOUND EA 15: CPT

## 2019-04-30 PROCEDURE — 97140 MANUAL THERAPY 1/> REGIONS: CPT

## 2019-04-30 NOTE — PROGRESS NOTES
Dx: Dx:  Left hip pain (M25.552) Primary osteoarthritis of left hip (M16.12         Authorized # of Visits:  Medical Necessity                                  Next MD visit: nonescheduled  Fall Risk: standard         Precautions: n/a             Subjective focused on hip strengthening for improved stability and HEP compliance as pt will have a gap in care for approx 9 days given scheduling conflicts with her 's care. Pt is progressing with WB activities.  Pt expresses concern over balance difficulties and shin after 1 min  PT instructs pt in standing hip flexor stretch LLE 30 sec 5x* PT provides mod verbal and visual cues for stepping distant* PT educates pt  On contacting MD about noncompliance with medication, feeling it is getting worse, PT educates Each side, PT provides  Min verbal cues for soft knees to decrease strain on low back  US for pain releif, soft tissue extensibiltiy L hip flexor area: 5 cm/2 8 mins 1.3 mHZ Sidelying manual hip flexor and quad stretch L ITB 30 secs 4x eachc Manual PT OP M each PT reviews standing hip flexor stretch mod demo for recall Child pose stretch 30 secs 3x Donkey kicks reviewed 10x2 each side mod tactile cues for neutral spine        PT educates pt on medication safety and consulting MD/pharmacist on medication prado

## 2019-05-03 ENCOUNTER — APPOINTMENT (OUTPATIENT)
Dept: PHYSICAL THERAPY | Age: 79
End: 2019-05-03
Attending: FAMILY MEDICINE
Payer: MEDICARE

## 2019-05-09 ENCOUNTER — PATIENT MESSAGE (OUTPATIENT)
Dept: FAMILY MEDICINE CLINIC | Facility: CLINIC | Age: 79
End: 2019-05-09

## 2019-05-09 ENCOUNTER — APPOINTMENT (OUTPATIENT)
Dept: PHYSICAL THERAPY | Age: 79
End: 2019-05-09
Attending: FAMILY MEDICINE
Payer: MEDICARE

## 2019-05-09 NOTE — TELEPHONE ENCOUNTER
From: Fabrice Forward  To: Génesis Becerril DO  Sent: 5/9/2019 10:30 AM CDT  Subject: Non-Urgent Medical Question    Dr. Katalina Villanueva, please review my latest blood test. I am concerned about my alkaline phosphate vount. shoulk I make an appointment to [facility]?

## 2019-05-13 ENCOUNTER — OFFICE VISIT (OUTPATIENT)
Dept: PHYSICAL THERAPY | Age: 79
End: 2019-05-13
Attending: FAMILY MEDICINE
Payer: MEDICARE

## 2019-05-13 PROCEDURE — 97110 THERAPEUTIC EXERCISES: CPT

## 2019-05-15 ENCOUNTER — APPOINTMENT (OUTPATIENT)
Dept: PHYSICAL THERAPY | Age: 79
End: 2019-05-15
Attending: FAMILY MEDICINE
Payer: MEDICARE

## 2019-05-20 ENCOUNTER — APPOINTMENT (OUTPATIENT)
Dept: PHYSICAL THERAPY | Age: 79
End: 2019-05-20
Attending: FAMILY MEDICINE
Payer: MEDICARE

## 2019-05-24 ENCOUNTER — APPOINTMENT (OUTPATIENT)
Dept: PHYSICAL THERAPY | Age: 79
End: 2019-05-24
Attending: FAMILY MEDICINE
Payer: MEDICARE

## 2019-05-28 ENCOUNTER — APPOINTMENT (OUTPATIENT)
Dept: PHYSICAL THERAPY | Age: 79
End: 2019-05-28
Attending: FAMILY MEDICINE
Payer: MEDICARE

## 2019-07-18 DIAGNOSIS — I10 ESSENTIAL HYPERTENSION: ICD-10-CM

## 2019-07-18 RX ORDER — LOSARTAN POTASSIUM 100 MG/1
TABLET ORAL
Qty: 90 TABLET | Refills: 0 | Status: SHIPPED | OUTPATIENT
Start: 2019-07-18 | End: 2019-09-07

## 2019-07-24 ENCOUNTER — HOSPITAL ENCOUNTER (OUTPATIENT)
Dept: MAMMOGRAPHY | Age: 79
Discharge: HOME | End: 2019-07-24
Attending: OBSTETRICS & GYNECOLOGY
Payer: MEDICARE

## 2019-07-24 ENCOUNTER — HOSPITAL ENCOUNTER (OUTPATIENT)
Dept: BONE DENSITY | Age: 79
Discharge: HOME | End: 2019-07-24
Attending: FAMILY MEDICINE
Payer: MEDICARE

## 2019-07-24 DIAGNOSIS — Z78.0 ASYMPTOMATIC MENOPAUSE: ICD-10-CM

## 2019-07-24 DIAGNOSIS — Z13.820 SCREENING FOR OSTEOPOROSIS: ICD-10-CM

## 2019-07-24 DIAGNOSIS — Z12.31 ENCOUNTER FOR SCREENING MAMMOGRAM FOR MALIGNANT NEOPLASM OF BREAST: ICD-10-CM

## 2019-07-24 PROCEDURE — 77080 DXA BONE DENSITY AXIAL: CPT | Performed by: FAMILY MEDICINE

## 2019-07-24 PROCEDURE — 77067 SCR MAMMO BI INCL CAD: CPT | Performed by: OBSTETRICS & GYNECOLOGY

## 2019-07-24 PROCEDURE — 77063 BREAST TOMOSYNTHESIS BI: CPT | Performed by: OBSTETRICS & GYNECOLOGY

## 2019-09-07 DIAGNOSIS — I10 ESSENTIAL HYPERTENSION: ICD-10-CM

## 2019-09-09 NOTE — TELEPHONE ENCOUNTER
Rx Request  LOSARTAN 100 MG Oral Tab  Last Refilled: 7/18/2019  Disp:     90               R: 0    hydrochlorothiazide 25 MG Oral Tab  Last Refilled: 9/14/2018  Disp:     90               R: 3    Associated Dx: Essential hypertension    Last Visit: 04/04/2019    Protocol Passed?  Yes[ X ]       No[  ]

## 2019-09-10 ENCOUNTER — PATIENT OUTREACH (OUTPATIENT)
Dept: FAMILY MEDICINE CLINIC | Facility: CLINIC | Age: 79
End: 2019-09-10

## 2019-09-10 RX ORDER — LOSARTAN POTASSIUM 100 MG/1
TABLET ORAL
Qty: 90 TABLET | Refills: 0 | Status: SHIPPED | OUTPATIENT
Start: 2019-09-10 | End: 2019-10-04

## 2019-09-10 RX ORDER — HYDROCHLOROTHIAZIDE 25 MG/1
TABLET ORAL
Qty: 90 TABLET | Refills: 0 | Status: SHIPPED | OUTPATIENT
Start: 2019-09-10 | End: 2019-09-17

## 2019-09-11 ENCOUNTER — LAB ENCOUNTER (OUTPATIENT)
Dept: LAB | Age: 79
End: 2019-09-11
Attending: FAMILY MEDICINE
Payer: MEDICARE

## 2019-09-11 DIAGNOSIS — E78.00 ELEVATED CHOLESTEROL: ICD-10-CM

## 2019-09-11 DIAGNOSIS — E55.9 VITAMIN D DEFICIENCY: ICD-10-CM

## 2019-09-11 DIAGNOSIS — I10 ESSENTIAL HYPERTENSION: ICD-10-CM

## 2019-09-11 DIAGNOSIS — E53.8 VITAMIN B12 DEFICIENCY: ICD-10-CM

## 2019-09-11 LAB
ALBUMIN SERPL-MCNC: 3.8 G/DL (ref 3.4–5)
ALBUMIN/GLOB SERPL: 1 {RATIO} (ref 1–2)
ALP LIVER SERPL-CCNC: 154 U/L (ref 55–142)
ALT SERPL-CCNC: 39 U/L (ref 13–56)
ANION GAP SERPL CALC-SCNC: 6 MMOL/L (ref 0–18)
AST SERPL-CCNC: 32 U/L (ref 15–37)
BASOPHILS # BLD AUTO: 0.08 X10(3) UL (ref 0–0.2)
BASOPHILS NFR BLD AUTO: 0.7 %
BILIRUB SERPL-MCNC: 0.5 MG/DL (ref 0.1–2)
BUN BLD-MCNC: 28 MG/DL (ref 7–18)
BUN/CREAT SERPL: 24.1 (ref 10–20)
CALCIUM BLD-MCNC: 9.6 MG/DL (ref 8.5–10.1)
CHLORIDE SERPL-SCNC: 100 MMOL/L (ref 98–112)
CHOLEST SMN-MCNC: 281 MG/DL (ref ?–200)
CO2 SERPL-SCNC: 33 MMOL/L (ref 21–32)
CREAT BLD-MCNC: 1.16 MG/DL (ref 0.55–1.02)
DEPRECATED RDW RBC AUTO: 47.8 FL (ref 35.1–46.3)
EOSINOPHIL # BLD AUTO: 0.33 X10(3) UL (ref 0–0.7)
EOSINOPHIL NFR BLD AUTO: 3 %
ERYTHROCYTE [DISTWIDTH] IN BLOOD BY AUTOMATED COUNT: 14.8 % (ref 11–15)
GLOBULIN PLAS-MCNC: 4 G/DL (ref 2.8–4.4)
GLUCOSE BLD-MCNC: 95 MG/DL (ref 70–99)
HCT VFR BLD AUTO: 41.1 % (ref 35–48)
HDLC SERPL-MCNC: 98 MG/DL (ref 40–59)
HGB BLD-MCNC: 13.9 G/DL (ref 12–16)
IMM GRANULOCYTES # BLD AUTO: 0.02 X10(3) UL (ref 0–1)
IMM GRANULOCYTES NFR BLD: 0.2 %
LDLC SERPL CALC-MCNC: 164 MG/DL (ref ?–100)
LYMPHOCYTES # BLD AUTO: 4.77 X10(3) UL (ref 1–4)
LYMPHOCYTES NFR BLD AUTO: 43.7 %
M PROTEIN MFR SERPL ELPH: 7.8 G/DL (ref 6.4–8.2)
MCH RBC QN AUTO: 30 PG (ref 26–34)
MCHC RBC AUTO-ENTMCNC: 33.8 G/DL (ref 31–37)
MCV RBC AUTO: 88.8 FL (ref 80–100)
MONOCYTES # BLD AUTO: 0.76 X10(3) UL (ref 0.1–1)
MONOCYTES NFR BLD AUTO: 7 %
NEUTROPHILS # BLD AUTO: 4.95 X10 (3) UL (ref 1.5–7.7)
NEUTROPHILS # BLD AUTO: 4.95 X10(3) UL (ref 1.5–7.7)
NEUTROPHILS NFR BLD AUTO: 45.4 %
NONHDLC SERPL-MCNC: 183 MG/DL (ref ?–130)
OSMOLALITY SERPL CALC.SUM OF ELEC: 293 MOSM/KG (ref 275–295)
PLATELET # BLD AUTO: 288 10(3)UL (ref 150–450)
POTASSIUM SERPL-SCNC: 4.1 MMOL/L (ref 3.5–5.1)
RBC # BLD AUTO: 4.63 X10(6)UL (ref 3.8–5.3)
SODIUM SERPL-SCNC: 139 MMOL/L (ref 136–145)
T4 FREE SERPL-MCNC: 1 NG/DL (ref 0.8–1.7)
TRIGL SERPL-MCNC: 96 MG/DL (ref 30–149)
TSI SER-ACNC: 3.09 MIU/ML (ref 0.36–3.74)
VIT B12 SERPL-MCNC: 1727 PG/ML (ref 193–986)
VIT D+METAB SERPL-MCNC: 59.6 NG/ML (ref 30–100)
VLDLC SERPL CALC-MCNC: 19 MG/DL (ref 0–30)
WBC # BLD AUTO: 10.9 X10(3) UL (ref 4–11)

## 2019-09-11 PROCEDURE — 84439 ASSAY OF FREE THYROXINE: CPT

## 2019-09-11 PROCEDURE — 82607 VITAMIN B-12: CPT

## 2019-09-11 PROCEDURE — 84443 ASSAY THYROID STIM HORMONE: CPT

## 2019-09-11 PROCEDURE — 80061 LIPID PANEL: CPT

## 2019-09-11 PROCEDURE — 82306 VITAMIN D 25 HYDROXY: CPT

## 2019-09-11 PROCEDURE — 85025 COMPLETE CBC W/AUTO DIFF WBC: CPT

## 2019-09-11 PROCEDURE — 80053 COMPREHEN METABOLIC PANEL: CPT

## 2019-09-17 ENCOUNTER — OFFICE VISIT (OUTPATIENT)
Dept: FAMILY MEDICINE CLINIC | Facility: CLINIC | Age: 79
End: 2019-09-17
Payer: MEDICARE

## 2019-09-17 VITALS
TEMPERATURE: 98 F | HEART RATE: 79 BPM | SYSTOLIC BLOOD PRESSURE: 126 MMHG | OXYGEN SATURATION: 98 % | DIASTOLIC BLOOD PRESSURE: 80 MMHG | WEIGHT: 138 LBS | RESPIRATION RATE: 16 BRPM | BODY MASS INDEX: 27.09 KG/M2 | HEIGHT: 60 IN

## 2019-09-17 DIAGNOSIS — Z85.828 HISTORY OF SKIN CANCER: ICD-10-CM

## 2019-09-17 DIAGNOSIS — M25.511 CHRONIC RIGHT SHOULDER PAIN: ICD-10-CM

## 2019-09-17 DIAGNOSIS — K21.00 GASTROESOPHAGEAL REFLUX DISEASE WITH ESOPHAGITIS: ICD-10-CM

## 2019-09-17 DIAGNOSIS — L71.9 ROSACEA: ICD-10-CM

## 2019-09-17 DIAGNOSIS — G89.29 CHRONIC RIGHT SHOULDER PAIN: ICD-10-CM

## 2019-09-17 DIAGNOSIS — G47.09 OTHER INSOMNIA: ICD-10-CM

## 2019-09-17 DIAGNOSIS — M81.8 OTHER OSTEOPOROSIS WITHOUT CURRENT PATHOLOGICAL FRACTURE: ICD-10-CM

## 2019-09-17 DIAGNOSIS — Z13.31 DEPRESSION SCREENING: ICD-10-CM

## 2019-09-17 DIAGNOSIS — I10 ESSENTIAL HYPERTENSION: ICD-10-CM

## 2019-09-17 DIAGNOSIS — K83.01 PRIMARY SCLEROSING CHOLANGITIS: ICD-10-CM

## 2019-09-17 DIAGNOSIS — E56.9 VITAMIN DEFICIENCY: ICD-10-CM

## 2019-09-17 DIAGNOSIS — B00.9 HSV INFECTION: ICD-10-CM

## 2019-09-17 DIAGNOSIS — G89.29 OTHER CHRONIC BACK PAIN: ICD-10-CM

## 2019-09-17 DIAGNOSIS — E78.00 ELEVATED CHOLESTEROL: ICD-10-CM

## 2019-09-17 DIAGNOSIS — Z00.00 ENCOUNTER FOR ANNUAL HEALTH EXAMINATION: Primary | ICD-10-CM

## 2019-09-17 DIAGNOSIS — M54.9 OTHER CHRONIC BACK PAIN: ICD-10-CM

## 2019-09-17 PROCEDURE — 99213 OFFICE O/P EST LOW 20 MIN: CPT | Performed by: FAMILY MEDICINE

## 2019-09-17 PROCEDURE — G0444 DEPRESSION SCREEN ANNUAL: HCPCS | Performed by: FAMILY MEDICINE

## 2019-09-17 PROCEDURE — G0439 PPPS, SUBSEQ VISIT: HCPCS | Performed by: FAMILY MEDICINE

## 2019-09-17 RX ORDER — AMLODIPINE BESYLATE 10 MG/1
10 TABLET ORAL DAILY
Qty: 90 TABLET | Refills: 0 | Status: SHIPPED | OUTPATIENT
Start: 2019-09-17 | End: 2019-11-05

## 2019-09-17 RX ORDER — HYDROCHLOROTHIAZIDE 25 MG/1
25 TABLET ORAL
Qty: 90 TABLET | Refills: 1 | Status: SHIPPED | OUTPATIENT
Start: 2019-09-17 | End: 2020-07-15

## 2019-09-17 RX ORDER — ZOLPIDEM TARTRATE 5 MG/1
TABLET ORAL
Qty: 90 TABLET | Refills: 3 | Status: SHIPPED | OUTPATIENT
Start: 2019-09-17 | End: 2020-03-16

## 2019-09-17 NOTE — PROGRESS NOTES
HPI:   Keith Mcginnis is a 78year old female who presents for a Medicare Subsequent Annual Wellness visit (Pt already had Initial Annual Wellness).     Pt also here with   Rosacea     Vitamin deficiency     Essential hypertension     HSV infection She has never smoked tobacco.    CAGE Alcohol screening   Veryl Berkeley was screened for Alcohol abuse and had a score of 0 so is at low risk.     Patient Care Team: Patient Care Team:  Najma Logan DO as PCP - [facility])  SALLY Mathias (25 mg total) by mouth once daily. amLODIPine Besylate 10 MG Oral Tab Take 1 tablet (10 mg total) by mouth daily.    LOSARTAN 100 MG Oral Tab TAKE 1 TABLET BY MOUTH ONCE DAILY   Estradiol 0.0375 MG/24HR Transdermal Patch Biweekly Place 1 patch onto the sk incontinence   MUSCULOSKELETAL: denies back pain  NEURO: denies headaches  PSYCHE: denies depression or anxiety  HEMATOLOGIC: denies hx of anemia  ENDOCRINE: denies thyroid history  ALL/ASTHMA: denies asthma    EXAM:   /80   Pulse 79   Temp 98 °F (36 AND OTHER RELEVANT CHRONIC CONDITIONS:   Joshua Arora is a 78year old female who presents for a Medicare Assessment.      PLAN SUMMARY:   Diagnoses and all orders for this visit:    Encounter for annual health examination    Rosacea- stable monitor   - In the past six months, have you lost more than 10 pounds without trying?: 2 - No  Has your appetite been poor?: No  How does the patient maintain a good energy level?: Daily Walks;Stretching; Appropriate Exercise  How would you describe your daily physic at high risk There are no preventive care reminders to display for this patient. Update Health Maintenance if applicable    Chlamydia  Annually if high risk No results found for: CHLAMYDIA No flowsheet data found.     Screening Mammogram      Mammogram Romy flowsheet data found. Drug Serum Conc  Annually No results found for: DIGOXIN, DIG, VALP No flowsheet data found.                Template: [facility]B MEDICARE ANNUAL ASSESSMENT FEMALE [28024]

## 2019-09-17 NOTE — PATIENT INSTRUCTIONS
Greta Azevedo's SCREENING SCHEDULE   Tests on this list are recommended by your physician but may not be covered, or covered at this frequency, by your insurer. Please check with your insurance carrier before scheduling to verify coverage.    NATY screening (once between ages 73-68) IPPE only No results found for this or any previous visit.  Limited to patients who meet one of the following criteria:   • Men who are 73-68 years old and have smoked more than 100 cigarettes in their lifetime   • Anyone Annually to at least age 76, and as needed after 76 There are no preventive care reminders to display for this patient. Please get this Mammogram regularly   Immunizations      Influenza  Covered Annually No orders found for this or any previous visit.  Ple available in [place]  [email or web address]  This link also has information from the [facility] regarding Advance Directives.

## 2019-10-02 ENCOUNTER — PATIENT MESSAGE (OUTPATIENT)
Dept: FAMILY MEDICINE CLINIC | Facility: CLINIC | Age: 79
End: 2019-10-02

## 2019-10-02 DIAGNOSIS — I10 ESSENTIAL HYPERTENSION: ICD-10-CM

## 2019-10-02 NOTE — TELEPHONE ENCOUNTER
From: Fabrice Forward  To: Génesis Becerril DO  Sent: 10/2/2019 8:26 AM CDT  Subject: Visit Follow-up Question    The new blood pressure med is working Amlodipine however my feet and ankles are swelling. I don't need more problems with my feet.  Could you pr

## 2019-10-04 RX ORDER — LOSARTAN POTASSIUM 100 MG/1
100 TABLET ORAL
Qty: 90 TABLET | Refills: 3 | Status: SHIPPED | OUTPATIENT
Start: 2019-10-04 | End: 2020-11-09

## 2019-11-05 PROBLEM — H52.00 HYPERMETROPIA: Status: ACTIVE | Noted: 2019-03-14

## 2019-11-05 PROBLEM — Z96.1 BILATERAL PSEUDOPHAKIA: Status: ACTIVE | Noted: 2017-08-31

## 2019-11-05 PROBLEM — H52.4 PRESBYOPIA: Status: ACTIVE | Noted: 2017-08-31

## 2019-11-05 PROBLEM — K57.30 DIVERTICULOSIS OF COLON: Status: ACTIVE | Noted: 2019-11-05

## 2019-11-05 PROBLEM — H52.229 REGULAR ASTIGMATISM: Status: ACTIVE | Noted: 2019-03-14

## 2019-11-05 PROBLEM — K21.9 ESOPHAGEAL REFLUX: Status: ACTIVE | Noted: 2019-11-05

## 2019-11-05 PROBLEM — H35.3130 BILATERAL NONEXUDATIVE AGE-RELATED MACULAR DEGENERATION: Status: ACTIVE | Noted: 2019-03-14

## 2019-11-05 PROBLEM — D31.32 CHOROIDAL NEVUS OF LEFT EYE: Status: ACTIVE | Noted: 2019-11-04

## 2019-12-04 ENCOUNTER — HOSPITAL ENCOUNTER (OUTPATIENT)
Dept: MRI IMAGING | Age: 79
Discharge: HOME | End: 2019-12-04
Attending: PHYSICIAN ASSISTANT
Payer: MEDICARE

## 2019-12-04 ENCOUNTER — HOSPITAL ENCOUNTER (OUTPATIENT)
Dept: ULTRASOUND IMAGING | Age: 79
Discharge: HOME | End: 2019-12-04
Attending: PHYSICIAN ASSISTANT
Payer: MEDICARE

## 2019-12-04 DIAGNOSIS — R74.8 ELEVATED LIVER ENZYMES: ICD-10-CM

## 2019-12-04 DIAGNOSIS — K74.3 PRIMARY BILIARY CHOLANGITIS (HCC): ICD-10-CM

## 2019-12-04 PROCEDURE — 74183 MRI ABD W/O CNTR FLWD CNTR: CPT | Performed by: PHYSICIAN ASSISTANT

## 2019-12-04 PROCEDURE — 76705 ECHO EXAM OF ABDOMEN: CPT | Performed by: PHYSICIAN ASSISTANT

## 2019-12-04 PROCEDURE — 76981 USE PARENCHYMA: CPT | Performed by: PHYSICIAN ASSISTANT

## 2019-12-04 PROCEDURE — A9581 GADOXETATE DISODIUM INJ: HCPCS | Performed by: PHYSICIAN ASSISTANT

## 2019-12-04 PROCEDURE — 82565 ASSAY OF CREATININE: CPT

## 2020-01-02 ENCOUNTER — TELEPHONE (OUTPATIENT)
Dept: FAMILY MEDICINE CLINIC | Facility: CLINIC | Age: 80
End: 2020-01-02

## 2020-01-13 ENCOUNTER — TELEPHONE (OUTPATIENT)
Dept: FAMILY MEDICINE CLINIC | Facility: CLINIC | Age: 80
End: 2020-01-13

## 2020-03-03 ENCOUNTER — HOSPITAL ENCOUNTER (OUTPATIENT)
Dept: GENERAL RADIOLOGY | Age: 80
Discharge: HOME | End: 2020-03-03
Attending: ORTHOPAEDIC SURGERY
Payer: MEDICARE

## 2020-03-03 DIAGNOSIS — M79.605 LEFT LEG PAIN: ICD-10-CM

## 2020-03-03 PROCEDURE — 73562 X-RAY EXAM OF KNEE 3: CPT | Performed by: ORTHOPAEDIC SURGERY

## 2020-03-03 PROCEDURE — 73564 X-RAY EXAM KNEE 4 OR MORE: CPT | Performed by: ORTHOPAEDIC SURGERY

## 2020-03-03 PROCEDURE — 73502 X-RAY EXAM HIP UNI 2-3 VIEWS: CPT | Performed by: ORTHOPAEDIC SURGERY

## 2020-03-04 ENCOUNTER — PATIENT MESSAGE (OUTPATIENT)
Dept: FAMILY MEDICINE CLINIC | Facility: CLINIC | Age: 80
End: 2020-03-04

## 2020-03-04 NOTE — TELEPHONE ENCOUNTER
From: Jason Kc  To: Vanessa Woodward DO  Sent: 3/4/2020 8:02 AM CST  Subject: Test Results Question    could you look at the test results for the ultra sound and MRI that was done at the request of Dr. Elvira Munoz and advise me as to how to proceed.  His of

## 2020-03-05 NOTE — TELEPHONE ENCOUNTER
Scarring of left kidney ; will need to monitor renal function   Small kidney cyst, will monitor, not urgent

## 2020-03-16 DIAGNOSIS — G47.09 OTHER INSOMNIA: ICD-10-CM

## 2020-03-16 RX ORDER — ZOLPIDEM TARTRATE 5 MG/1
TABLET ORAL
Qty: 90 TABLET | Refills: 0 | Status: SHIPPED | OUTPATIENT
Start: 2020-03-16 | End: 2020-06-25

## 2020-06-24 DIAGNOSIS — G47.09 OTHER INSOMNIA: ICD-10-CM

## 2020-06-25 RX ORDER — ZOLPIDEM TARTRATE 5 MG/1
TABLET ORAL
Qty: 90 TABLET | Refills: 0 | Status: SHIPPED | OUTPATIENT
Start: 2020-06-25 | End: 2020-09-19

## 2020-07-14 DIAGNOSIS — I10 ESSENTIAL HYPERTENSION: ICD-10-CM

## 2020-07-15 RX ORDER — HYDROCHLOROTHIAZIDE 25 MG/1
TABLET ORAL
Qty: 60 TABLET | Refills: 0 | Status: SHIPPED | OUTPATIENT
Start: 2020-07-15 | End: 2020-09-14

## 2020-09-06 ENCOUNTER — HOSPITAL ENCOUNTER (OUTPATIENT)
Dept: MRI IMAGING | Age: 80
Discharge: HOME | End: 2020-09-06
Attending: ORTHOPAEDIC SURGERY
Payer: MEDICARE

## 2020-09-06 DIAGNOSIS — M54.16 LUMBAR RADICULOPATHY: ICD-10-CM

## 2020-09-06 DIAGNOSIS — M79.652 THIGH PAIN, MUSCULOSKELETAL, LEFT: ICD-10-CM

## 2020-09-06 PROCEDURE — 73718 MRI LOWER EXTREMITY W/O DYE: CPT | Performed by: ORTHOPAEDIC SURGERY

## 2020-09-06 PROCEDURE — 72148 MRI LUMBAR SPINE W/O DYE: CPT | Performed by: ORTHOPAEDIC SURGERY

## 2020-09-12 DIAGNOSIS — I10 ESSENTIAL HYPERTENSION: ICD-10-CM

## 2020-09-14 RX ORDER — HYDROCHLOROTHIAZIDE 25 MG/1
TABLET ORAL
Qty: 60 TABLET | Refills: 0 | Status: SHIPPED | OUTPATIENT
Start: 2020-09-14 | End: 2020-11-09

## 2020-09-14 NOTE — TELEPHONE ENCOUNTER
Next Appt:    With [facility] Boris Chiu DO)  10/05/2020 at 1:30 PM     9-17-19    LR 7-15-20

## 2020-09-15 ENCOUNTER — VIRTUAL PHONE E/M (OUTPATIENT)
Dept: FAMILY MEDICINE CLINIC | Facility: CLINIC | Age: 80
End: 2020-09-15
Payer: MEDICARE

## 2020-09-15 ENCOUNTER — TELEPHONE (OUTPATIENT)
Dept: FAMILY MEDICINE CLINIC | Facility: CLINIC | Age: 80
End: 2020-09-15

## 2020-09-15 DIAGNOSIS — R39.9 URINARY TRACT INFECTION SYMPTOMS: Primary | ICD-10-CM

## 2020-09-15 PROCEDURE — 99213 OFFICE O/P EST LOW 20 MIN: CPT | Performed by: FAMILY MEDICINE

## 2020-09-15 RX ORDER — SULFAMETHOXAZOLE AND TRIMETHOPRIM 400; 80 MG/1; MG/1
1 TABLET ORAL 2 TIMES DAILY
Qty: 14 TABLET | Refills: 0 | Status: SHIPPED | OUTPATIENT
Start: 2020-09-15 | End: 2020-09-22

## 2020-09-15 NOTE — PROGRESS NOTES
Virtual Telephone Check-In    Eileen Bales verbally consents to a Virtual/Telephone Check-In visit on 09/15/20. Patient has been referred to the [facility] website at [email or web address] to review the yearly Consent to Treat document.     Patient unders

## 2020-09-15 NOTE — TELEPHONE ENCOUNTER
DAUGHTER CALLING TO MAKE APPT FOR MOM PER OUR NURSE BRYAN'S REPLY TO HER. SHE IS UPSET THAT BRYAN DID NOT CALL AND MAKE HER APPOINTMENT AT THE TIME SHE ANSWERED THE MESSAGE.     DAUGHTER, KAPIL, WANTS HER PUT IN THE SCHEDULE TODAY FOR A PHONE VISIT

## 2020-09-18 DIAGNOSIS — G47.09 OTHER INSOMNIA: ICD-10-CM

## 2020-09-19 RX ORDER — ZOLPIDEM TARTRATE 5 MG/1
TABLET ORAL
Qty: 90 TABLET | Refills: 0 | Status: SHIPPED | OUTPATIENT
Start: 2020-09-19 | End: 2020-11-09

## 2020-09-19 NOTE — TELEPHONE ENCOUNTER
Next Appt:    With 7 [facility] Fabian Li DO)  10/05/2020 at 1:30 PM     9-15-20    LR 6-25-20

## 2020-09-25 ENCOUNTER — TELEPHONE (OUTPATIENT)
Dept: FAMILY MEDICINE CLINIC | Facility: CLINIC | Age: 80
End: 2020-09-25

## 2020-09-25 NOTE — TELEPHONE ENCOUNTER
Daughter asking if pt can take the diclofenac to help relieve her pain at night - will this be appropriate?   Pt had taken this recently and it helped (prescribed by another doctor for old injury) If okay, will Dr. Mallika Velarde send script to [address] [facility] in [place], [place]

## 2020-09-25 NOTE — TELEPHONE ENCOUNTER
Can pt take Diclofenac at night? If so, needs RX. See message    Will try Monday to get pt sooner appt with neuro.

## 2020-09-25 NOTE — TELEPHONE ENCOUNTER
KARIN---    Spoke to daughter, José Manuel Mathews,  1 year ago pt slipped on ice and hurt leg, then on Aug 26 dog jumped on pt and fell, saw Dr. Scarlett Rodriguez, had MRI leg/hip/back done,  MRI showed back issues/Sciatic nerve pain, tried PT x 2 weeks with no relief  Has appt w

## 2020-09-26 NOTE — TELEPHONE ENCOUNTER
Called and left a detailed message for daughter Henry Gonzalez with Dr. Johanna Antoine recommendations. Pt may take tramadol and tylenol   Pt may not take advil motrin ibuprofen or aleve - these are NSAIDS like diclofenac.

## 2020-09-26 NOTE — TELEPHONE ENCOUNTER
Pt may take tramadol and tylenol   Pt may not take advil motrin ibuprofen or aleve - these are NSAIDS like diclofenac

## 2020-09-27 ENCOUNTER — HOSPITAL ENCOUNTER (EMERGENCY)
Facility: HOSPITAL | Age: 80
Discharge: HOME | End: 2020-09-27
Attending: EMERGENCY MEDICINE
Payer: MEDICARE

## 2020-09-27 ENCOUNTER — APPOINTMENT (OUTPATIENT)
Dept: GENERAL RADIOLOGY | Facility: HOSPITAL | Age: 80
End: 2020-09-27
Attending: EMERGENCY MEDICINE
Payer: MEDICARE

## 2020-09-27 VITALS
HEART RATE: 72 BPM | OXYGEN SATURATION: 97 % | WEIGHT: 145.06 LBS | TEMPERATURE: 97 F | DIASTOLIC BLOOD PRESSURE: 68 MMHG | RESPIRATION RATE: 18 BRPM | HEIGHT: 60 IN | BODY MASS INDEX: 28.48 KG/M2 | SYSTOLIC BLOOD PRESSURE: 139 MMHG

## 2020-09-27 DIAGNOSIS — M54.40 BACK PAIN OF LUMBAR REGION WITH SCIATICA: Primary | ICD-10-CM

## 2020-09-27 DIAGNOSIS — M21.372 LEFT FOOT DROP: ICD-10-CM

## 2020-09-27 PROCEDURE — 73560 X-RAY EXAM OF KNEE 1 OR 2: CPT | Performed by: EMERGENCY MEDICINE

## 2020-09-27 PROCEDURE — 99284 EMERGENCY DEPT VISIT MOD MDM: CPT

## 2020-09-27 PROCEDURE — 96374 THER/PROPH/DIAG INJ IV PUSH: CPT

## 2020-09-27 RX ORDER — HYDROMORPHONE HYDROCHLORIDE 1 MG/ML
0.5 INJECTION, SOLUTION INTRAMUSCULAR; INTRAVENOUS; SUBCUTANEOUS ONCE
Status: COMPLETED | OUTPATIENT
Start: 2020-09-27 | End: 2020-09-27

## 2020-09-27 RX ORDER — GABAPENTIN 100 MG/1
100 CAPSULE ORAL NIGHTLY
Qty: 60 CAPSULE | Refills: 0 | Status: SHIPPED | OUTPATIENT
Start: 2020-09-27 | End: 2020-10-01

## 2020-09-27 NOTE — ED NOTES
Pt ambulated to bathroom w/ walker and standby assist. Slow but steady gait. Patient reports when moving her pain does not feel like it has changed and that when she sits on the toilet seat the back of her left leg is painful and numb.  MD made aware of pat

## 2020-09-27 NOTE — ED PROVIDER NOTES
Patient Seen in: [facility] Emergency Department      History   Patient presents with:  Leg or Foot Injury    Stated Complaint: left hip and leg pain x2 weeks after fall while walking dog    HPI    80-year-old white female who presents to the emerge esophagogastroduodenoscopy   • TUBAL LIGATION  1/1/65                    Social History    Tobacco Use      Smoking status: Never Smoker      Smokeless tobacco: Never Used    Alcohol use:  Yes      Alcohol/week: 7.0 standard drinks      Types: 7 Standard tibia measuring 1.5 cm in diameter. 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 Finding is stable. SOFT TISSUES:  Negative. No visible soft tissue swelling. EFFUSION:  None visible. OTHER:  Negative. CORD/CAUDA EQUINA:  Normal caliber, contour, and signal intensity.                     =====  CONCLUSION:  Multilevel disc bulges throughout the lumbar spine superimposed on facet and ligamentum hypertrophy.   Degree of disc space narrowing at L5-S1 has p revealed some multilevel disc bulges on the MRI of the lumbar spine. MRI the leg was negative. Patient appears to be having sciatic type pain and she has a left foot drop.   I did speak to neurosurgery and they reviewed her MRIs and did not find anything

## 2020-09-27 NOTE — ED INITIAL ASSESSMENT (HPI)
left hip and leg pain x2 weeks after fall while walking dog. Pt spoke with primary over the phone and was told to take motrin. Pt started PT with no relief. Pt states leg \"jumps\" while she sleeps. Pain worsening, has to use walker to walk.

## 2020-09-28 ENCOUNTER — TELEPHONE (OUTPATIENT)
Dept: NEUROLOGY | Facility: CLINIC | Age: 80
End: 2020-09-28

## 2020-09-28 NOTE — TELEPHONE ENCOUNTER
Patient placed in appt time from 11:10-11:50 on 10/1/2020. Spoke with daughter of patient and she states that appt time will work. VERY appreciative. PSR note:    Pt daughter is calling to schedule patient for this week with Dr. Nurys Llamas.   Pt has a

## 2020-10-01 ENCOUNTER — OFFICE VISIT (OUTPATIENT)
Dept: NEUROLOGY | Facility: CLINIC | Age: 80
End: 2020-10-01
Payer: MEDICARE

## 2020-10-01 ENCOUNTER — TELEPHONE (OUTPATIENT)
Dept: FAMILY MEDICINE CLINIC | Facility: CLINIC | Age: 80
End: 2020-10-01

## 2020-10-01 VITALS
BODY MASS INDEX: 28 KG/M2 | SYSTOLIC BLOOD PRESSURE: 124 MMHG | WEIGHT: 145 LBS | RESPIRATION RATE: 14 BRPM | HEART RATE: 60 BPM | DIASTOLIC BLOOD PRESSURE: 70 MMHG

## 2020-10-01 DIAGNOSIS — G89.29 CHRONIC LEFT-SIDED LOW BACK PAIN WITH SCIATICA, SCIATICA LATERALITY UNSPECIFIED: Primary | ICD-10-CM

## 2020-10-01 DIAGNOSIS — E56.9 VITAMIN DEFICIENCY: Primary | ICD-10-CM

## 2020-10-01 DIAGNOSIS — E53.8 VITAMIN B12 DEFICIENCY: ICD-10-CM

## 2020-10-01 DIAGNOSIS — M25.552 LEFT HIP PAIN: ICD-10-CM

## 2020-10-01 DIAGNOSIS — Z13.0 SCREENING FOR ENDOCRINE, METABOLIC AND IMMUNITY DISORDER: ICD-10-CM

## 2020-10-01 DIAGNOSIS — Z13.0 ENCOUNTER FOR SCREENING FOR HEMATOLOGIC DISORDER: ICD-10-CM

## 2020-10-01 DIAGNOSIS — Z13.228 SCREENING FOR ENDOCRINE, METABOLIC AND IMMUNITY DISORDER: ICD-10-CM

## 2020-10-01 DIAGNOSIS — E55.9 VITAMIN D DEFICIENCY: ICD-10-CM

## 2020-10-01 DIAGNOSIS — M54.40 CHRONIC LEFT-SIDED LOW BACK PAIN WITH SCIATICA, SCIATICA LATERALITY UNSPECIFIED: Primary | ICD-10-CM

## 2020-10-01 DIAGNOSIS — Z13.29 SCREENING FOR ENDOCRINE, METABOLIC AND IMMUNITY DISORDER: ICD-10-CM

## 2020-10-01 DIAGNOSIS — Z13.220 SCREENING FOR HYPERCHOLESTEROLEMIA: ICD-10-CM

## 2020-10-01 PROCEDURE — 99204 OFFICE O/P NEW MOD 45 MIN: CPT | Performed by: OTHER

## 2020-10-01 RX ORDER — GABAPENTIN 100 MG/1
CAPSULE ORAL
Qty: 90 CAPSULE | Refills: 1 | Status: SHIPPED | OUTPATIENT
Start: 2020-10-01 | End: 2020-10-02

## 2020-10-01 RX ORDER — TRAMADOL HYDROCHLORIDE 50 MG/1
100 TABLET ORAL 2 TIMES DAILY
COMMUNITY
Start: 2020-09-24 | End: 2020-12-01

## 2020-10-01 NOTE — H&P
Neurology H&P    Eileen Bales Patient Status:  No patient class for patient encounter    1/3/1940 MRN [id number]   Location [address] [place] [address], [address] [place] [address], [address] [place] [address] Attending No att. providers found   Hosp Day # 0 PCP DO Genevieve Higuera Biweekly Place 1 patch onto the skin twice a week.  24 patch 4   • Progesterone Micronized 100 MG Oral Cap Take one tab by mouth daily 90 capsule 4   • ursodiol 300 MG Oral Cap TAKE ONE CAPSULE BY MOUTH THREE TIMES DAILY 270 capsule 3   • losartan 100 MG Or 2009   • CHOLECYSTECTOMY  2006   • DILATION AND CURETTAGE N/A 5/11/2015    Performed by Arturo Borden MD at [facility]   • HYSTEROSCOPY, RESECTION OF POLYPS/FIBROIDS N/A 5/11/2015    Performed by Arturo Borden MD at [facility] RLE: HF: 5/5, KE: 5/5, KF: 5/5, DF: 5/5, PF: 5/5, Hip Adduction: 5/5, Hip Abduction: 5/5  LLE:  HF: 3/5, KE: 5/5, KF: 5/5, DF: 5/5, PF: 5/5, Hip Adduction: 5/5, Hip Abduction: 5/5  Normal Tone  No Fasiculations      SENSORY EXAMINATION:  UE: intact to li evaluate for any lumbar radiculopathy vs LLE neuropathy. MRI L spine reviewed today. Likely has some component of deconditioning as well. Plan:  1.  LLE pain  - MRI L spine reviewed in detail today  - EMG/NCS LLE  - Referral to pain management  - Incr

## 2020-10-01 NOTE — PROGRESS NOTES
Patient was having left hip and leg pain since August 2020. Patient states the twitching has continue and pain.  No changes since leaving the hospital.

## 2020-10-01 NOTE — TELEPHONE ENCOUNTER
OV from today with [address]:  1. LLE pain  - MRI L spine reviewed in detail today  - EMG/NCS LLE  - Referral to pain management  - Increase gabapentin to 100/300  - Continue Pain management      10/5/2020: annual exam with TATUM Johnson

## 2020-10-02 ENCOUNTER — TELEPHONE (OUTPATIENT)
Dept: NEUROLOGY | Facility: CLINIC | Age: 80
End: 2020-10-02

## 2020-10-02 DIAGNOSIS — G89.29 CHRONIC LEFT-SIDED LOW BACK PAIN WITH SCIATICA, SCIATICA LATERALITY UNSPECIFIED: Primary | ICD-10-CM

## 2020-10-02 DIAGNOSIS — M54.40 CHRONIC LEFT-SIDED LOW BACK PAIN WITH SCIATICA, SCIATICA LATERALITY UNSPECIFIED: Primary | ICD-10-CM

## 2020-10-02 RX ORDER — GABAPENTIN 100 MG/1
CAPSULE ORAL
Qty: 90 CAPSULE | Refills: 1 | Status: SHIPPED | OUTPATIENT
Start: 2020-10-02 | End: 2020-10-16

## 2020-10-03 ENCOUNTER — HOSPITAL ENCOUNTER (OUTPATIENT)
Dept: GENERAL RADIOLOGY | Age: 80
Discharge: HOME | End: 2020-10-03
Attending: FAMILY MEDICINE
Payer: MEDICARE

## 2020-10-03 ENCOUNTER — LAB ENCOUNTER (OUTPATIENT)
Dept: LAB | Age: 80
End: 2020-10-03
Attending: FAMILY MEDICINE
Payer: MEDICARE

## 2020-10-03 ENCOUNTER — TELEPHONE (OUTPATIENT)
Dept: FAMILY MEDICINE CLINIC | Facility: CLINIC | Age: 80
End: 2020-10-03

## 2020-10-03 DIAGNOSIS — Z13.220 SCREENING FOR HYPERCHOLESTEROLEMIA: ICD-10-CM

## 2020-10-03 DIAGNOSIS — Z13.0 ENCOUNTER FOR SCREENING FOR HEMATOLOGIC DISORDER: ICD-10-CM

## 2020-10-03 DIAGNOSIS — Z13.0 SCREENING FOR ENDOCRINE, METABOLIC AND IMMUNITY DISORDER: ICD-10-CM

## 2020-10-03 DIAGNOSIS — M25.552 LEFT HIP PAIN: ICD-10-CM

## 2020-10-03 DIAGNOSIS — Z13.228 SCREENING FOR ENDOCRINE, METABOLIC AND IMMUNITY DISORDER: ICD-10-CM

## 2020-10-03 DIAGNOSIS — E53.8 VITAMIN B12 DEFICIENCY: ICD-10-CM

## 2020-10-03 DIAGNOSIS — Z13.29 SCREENING FOR ENDOCRINE, METABOLIC AND IMMUNITY DISORDER: ICD-10-CM

## 2020-10-03 PROCEDURE — 73502 X-RAY EXAM HIP UNI 2-3 VIEWS: CPT | Performed by: FAMILY MEDICINE

## 2020-10-03 PROCEDURE — 80053 COMPREHEN METABOLIC PANEL: CPT

## 2020-10-03 PROCEDURE — 82607 VITAMIN B-12: CPT

## 2020-10-03 PROCEDURE — 85025 COMPLETE CBC W/AUTO DIFF WBC: CPT

## 2020-10-03 PROCEDURE — 84443 ASSAY THYROID STIM HORMONE: CPT

## 2020-10-03 PROCEDURE — 80061 LIPID PANEL: CPT

## 2020-10-03 PROCEDURE — 36415 COLL VENOUS BLD VENIPUNCTURE: CPT

## 2020-10-03 PROCEDURE — 84439 ASSAY OF FREE THYROXINE: CPT

## 2020-10-03 PROCEDURE — 82306 VITAMIN D 25 HYDROXY: CPT

## 2020-10-03 NOTE — TELEPHONE ENCOUNTER
Delayed entry: paged at 935 am by radiology regarding patient's xray images showing pelvic fracture. Tried calling number on file, no answer. PCP aware and RN has contacted daughter.

## 2020-10-04 ENCOUNTER — PATIENT MESSAGE (OUTPATIENT)
Dept: NEUROLOGY | Facility: CLINIC | Age: 80
End: 2020-10-04

## 2020-10-05 ENCOUNTER — OFFICE VISIT (OUTPATIENT)
Dept: ORTHOPEDICS CLINIC | Facility: CLINIC | Age: 80
End: 2020-10-05
Payer: MEDICARE

## 2020-10-05 VITALS — HEART RATE: 82 BPM | OXYGEN SATURATION: 98 %

## 2020-10-05 DIAGNOSIS — S32.502A CLOSED FRACTURE OF LEFT PUBIS, UNSPECIFIED PORTION OF PUBIS, INITIAL ENCOUNTER (HCC): Primary | ICD-10-CM

## 2020-10-05 PROCEDURE — 99203 OFFICE O/P NEW LOW 30 MIN: CPT | Performed by: ORTHOPAEDIC SURGERY

## 2020-10-05 NOTE — TELEPHONE ENCOUNTER
From: Ruth Waldrop  To: Shakira Eagle DO  Sent: 10/4/2020 2:01 PM CDT  Subject: Referral Request    This is Bala Ruiz, Paul Parisian daughter. I wanted to provide you with an update.   After her appointment with you on 10/1, I contacted

## 2020-10-05 NOTE — H&P
[facility] New Patient Note    CC: Patient presents with:  Hip Pain: left hip in jury , patient has fractures      HPI: This [de-identified]year old female presents today with complaints of left hip and lower extremity pain.   Patient reports she had a fall just POLYPS/FIBROIDS N/A 5/11/2015    Performed by Silvina Santos MD at [facility]   • MARIO LOCALIZATION WIRE 1 SITE LEFT (CPT=19281)  1960    BENIGN   • MARIO LOCALIZATION WIRE 1 SITE LEFT (CPT=19281)  1980    BENIGN   • NEEDLE BIOPSY RIGHT  7/ Standard drinks or equivalent per week        Comment: 1 glass red wine every night (8 oz)      Drug use: No      Sexual activity: Never        Partners: Male       ROS:  Detailed system review obtained and negative except as mentioned above      Physical 61-year-old female with acute closed traumatic left hemipelvis lateral compression type I fracture from fall from standing height over 1 month ago.   Also with radicular left lower extremity pain    Plan: I discussed the etiology as well as pathophysiology in 2 months with repeat x-rays. If there are any questions or issues in the meantime, she or her daughter will contact us.     Jodie Hernández MD  [facility] [place] [facility] Orthopedic Surgery

## 2020-10-06 ENCOUNTER — TELEPHONE (OUTPATIENT)
Dept: ORTHOPEDICS CLINIC | Facility: CLINIC | Age: 80
End: 2020-10-06

## 2020-10-06 ENCOUNTER — TELEPHONE (OUTPATIENT)
Dept: FAMILY MEDICINE CLINIC | Facility: CLINIC | Age: 80
End: 2020-10-06

## 2020-10-06 DIAGNOSIS — E87.6 LOW SERUM POTASSIUM: Primary | ICD-10-CM

## 2020-10-06 RX ORDER — POTASSIUM CHLORIDE 20 MEQ/1
20 TABLET, EXTENDED RELEASE ORAL DAILY
Qty: 14 TABLET | Refills: 0 | Status: SHIPPED | OUTPATIENT
Start: 2020-10-06 | End: 2020-10-20

## 2020-10-06 NOTE — TELEPHONE ENCOUNTER
Patient's daughter was told that Vit D cane be purchased over the counter.   However, she  does not understand the recommendation read to her per the chart note of  \" taking 50,000 units vitamin D once per week, as well as 1200 mg calcium/800 IU vitamin D

## 2020-10-06 NOTE — TELEPHONE ENCOUNTER
Per Dr. Villalobos Sites:  Add salt with diet. Repeat CMP 2 weeks. Saeid daughter notified, verbalized understanding. Saeid is aware to supplement K, Rx at [place].      ONI davalos

## 2020-10-06 NOTE — TELEPHONE ENCOUNTER
Patient daughter is asking if the Vitamin D can be called into today they are going to pharmacy soon

## 2020-10-06 NOTE — TELEPHONE ENCOUNTER
Viewed by Zion Almeida on 10/6/2020  7:31 AM  Written by Najma Logan DO on 10/5/2020 11:46 PM  Low sodium   Low K - pt needs to start [address] 20meq daily for 2 weeks then repeat cmp in 2 weeks   Alk phos has gone up - follow up needed with gi  Med and

## 2020-10-06 NOTE — TELEPHONE ENCOUNTER
Mom's lab work came back and her sodium level is 127. She is having some brain fogginess. Should she go on some sodium?

## 2020-10-07 ENCOUNTER — TELEPHONE (OUTPATIENT)
Dept: ORTHOPEDICS CLINIC | Facility: CLINIC | Age: 80
End: 2020-10-07

## 2020-10-07 RX ORDER — ERGOCALCIFEROL 1.25 MG/1
50000 CAPSULE ORAL WEEKLY
Qty: 12 CAPSULE | Refills: 0 | Status: SHIPPED | OUTPATIENT
Start: 2020-10-07 | End: 2020-12-01 | Stop reason: ALTCHOICE

## 2020-10-07 RX ORDER — ERGOCALCIFEROL 1.25 MG/1
50000 CAPSULE ORAL WEEKLY
Qty: 12 CAPSULE | Refills: 0 | Status: SHIPPED | OUTPATIENT
Start: 2020-10-07 | End: 2020-10-07

## 2020-10-07 NOTE — TELEPHONE ENCOUNTER
Please clarify   per note it states to take 50,000 weekly then calcium 1200-800 of vit D , patient is looking for RX of 50,000U of vitamin D.

## 2020-10-07 NOTE — TELEPHONE ENCOUNTER
Called patient and informed her that RX has been sent to the pharmacy, patient was confused with dosage of vit D , clarified dosage on vit D with patient

## 2020-10-07 NOTE — TELEPHONE ENCOUNTER
Patient's daughter Yakov Garg called stating that home health is at her mom's house and wanted to confirm the name of the company.   I explained that Dr. Girma Paul put in a home health referral to [facility] and to call back with any further question

## 2020-10-07 NOTE — TELEPHONE ENCOUNTER
She should take two different tablets/medications:    Purchase a 50,000 unit vitamin D tablet, which she should take once per week    Purchase a calcium/vitamin D combination tablet (I typically recommend Citracal 315-250 which has 315mg calcium and 250 un

## 2020-10-09 ENCOUNTER — OFFICE VISIT (OUTPATIENT)
Dept: PAIN CLINIC | Facility: CLINIC | Age: 80
End: 2020-10-09
Payer: MEDICARE

## 2020-10-09 VITALS
OXYGEN SATURATION: 97 % | HEIGHT: 60 IN | HEART RATE: 72 BPM | DIASTOLIC BLOOD PRESSURE: 60 MMHG | BODY MASS INDEX: 28.07 KG/M2 | WEIGHT: 143 LBS | SYSTOLIC BLOOD PRESSURE: 122 MMHG

## 2020-10-09 DIAGNOSIS — S32.502D CLOSED NONDISPLACED FRACTURE OF LEFT PUBIS WITH ROUTINE HEALING: Primary | ICD-10-CM

## 2020-10-09 PROCEDURE — 99203 OFFICE O/P NEW LOW 30 MIN: CPT | Performed by: ANESTHESIOLOGY

## 2020-10-09 NOTE — H&P
Name: Tayo Dudley   : 1/3/1940   DOS: 10/9/2020     Chief complaint: Left leg pain    History of present illness:  Tayo Dudley is a [de-identified]year old female who suffered a fall on or about , when she was knocked over by her dog.   She landed BY MOUTH AT BEDTIME AS NEEDED FOR SLEEP 90 tablet 0   • HYDROCHLOROTHIAZIDE 25 MG Oral Tab Take 1 tablet by mouth once daily 60 tablet 0   • Estradiol 0.0375 MG/24HR Transdermal Patch Biweekly Place 1 patch onto the skin twice a week.  24 patch 4   • Proges (64.9 kg)   LMP 11/10/2013   SpO2 97%   BMI 27.93 kg/m²    The patient is awake, alert, oriented and corporative. She has a normal affect. The patient is in a wheelchair. HEENT: No gross lesion noted. PEERL. No icterus. Neck and Upper Extremity: Supple. follow-up after EMG. This will help with injection planning. She voiced good understanding.       Tamra Loya MD  Pain Management

## 2020-10-09 NOTE — PROGRESS NOTES
Patient presents in office today with reported pain in right leg down into the foot    Current pain level reported = 8/10    Last reported dose of traMADol HCl 50 MG Oral Tab pt states in the AM day of OV  Location of Pain: Left leg

## 2020-10-13 ENCOUNTER — LAB ENCOUNTER (OUTPATIENT)
Dept: LAB | Age: 80
End: 2020-10-13
Attending: FAMILY MEDICINE
Payer: MEDICARE

## 2020-10-13 ENCOUNTER — PATIENT MESSAGE (OUTPATIENT)
Dept: NEUROLOGY | Facility: CLINIC | Age: 80
End: 2020-10-13

## 2020-10-13 DIAGNOSIS — G89.29 CHRONIC LEFT-SIDED LOW BACK PAIN WITH SCIATICA, SCIATICA LATERALITY UNSPECIFIED: Primary | ICD-10-CM

## 2020-10-13 DIAGNOSIS — E87.6 LOW SERUM POTASSIUM: ICD-10-CM

## 2020-10-13 DIAGNOSIS — M54.40 CHRONIC LEFT-SIDED LOW BACK PAIN WITH SCIATICA, SCIATICA LATERALITY UNSPECIFIED: Primary | ICD-10-CM

## 2020-10-13 PROCEDURE — 80053 COMPREHEN METABOLIC PANEL: CPT

## 2020-10-13 PROCEDURE — 36415 COLL VENOUS BLD VENIPUNCTURE: CPT

## 2020-10-13 RX ORDER — DICLOFENAC SODIUM 75 MG/1
75 TABLET, DELAYED RELEASE ORAL 2 TIMES DAILY
Qty: 60 TABLET | Refills: 0 | Status: SHIPPED | OUTPATIENT
Start: 2020-10-13 | End: 2020-11-09

## 2020-10-13 RX ORDER — GABAPENTIN 100 MG/1
200 CAPSULE ORAL 3 TIMES DAILY
Qty: 180 CAPSULE | Refills: 0 | Status: SHIPPED | OUTPATIENT
Start: 2020-10-13 | End: 2020-10-16

## 2020-10-13 NOTE — TELEPHONE ENCOUNTER
From: Antoinette Morales  To: Jose J Morrissey DO  Sent: 10/13/2020 4:58 PM CDT  Subject: Visit Follow-up Question    Good evening Dr. Preethi Vernon, This is [place] Commander daughter. My father called to tell me he doesn't know what to do.  My mom

## 2020-10-14 ENCOUNTER — HOSPITAL ENCOUNTER (OUTPATIENT)
Dept: GENERAL RADIOLOGY | Age: 80
Discharge: HOME | End: 2020-10-14
Attending: ORTHOPAEDIC SURGERY
Payer: MEDICARE

## 2020-10-14 DIAGNOSIS — S32.502A CLOSED FRACTURE OF LEFT PUBIS, UNSPECIFIED PORTION OF PUBIS, INITIAL ENCOUNTER (HCC): ICD-10-CM

## 2020-10-14 PROCEDURE — 72190 X-RAY EXAM OF PELVIS: CPT | Performed by: ORTHOPAEDIC SURGERY

## 2020-10-16 ENCOUNTER — TELEPHONE (OUTPATIENT)
Dept: NEUROLOGY | Facility: CLINIC | Age: 80
End: 2020-10-16

## 2020-10-16 RX ORDER — GABAPENTIN 100 MG/1
300 CAPSULE ORAL 3 TIMES DAILY
Qty: 1 CAPSULE | Refills: 0 | COMMUNITY
Start: 2020-10-16 | End: 2020-11-09

## 2020-10-16 NOTE — ADDENDUM NOTE
Addended by: Gerhard Navarrete on: 10/16/2020 04:24 PM     Modules accepted: Orders Detail Level: Detailed

## 2020-10-16 NOTE — TELEPHONE ENCOUNTER
Jono Hollingsworth DO 9 minutes ago (4:03 PM)        I called Mrs. Azevedo regarding her pain. She states that she was placed on gabapentin 200mg TID for pain by orthopedic surgery. She states that it seems to be helping a little bit.  I will increase the ga

## 2020-10-16 NOTE — TELEPHONE ENCOUNTER
I called Mrs. Azevedo regarding her pain. She states that she was placed on gabapentin 200mg TID for pain by orthopedic surgery. She states that it seems to be helping a little bit. I will increase the gabapentin to 300mg TID.  She has has appt with Orthopedi

## 2020-10-22 ENCOUNTER — PROCEDURE VISIT (OUTPATIENT)
Dept: NEUROLOGY | Facility: CLINIC | Age: 80
End: 2020-10-22
Payer: MEDICARE

## 2020-10-22 DIAGNOSIS — G89.29 CHRONIC RIGHT-SIDED LOW BACK PAIN WITH SCIATICA, SCIATICA LATERALITY UNSPECIFIED: ICD-10-CM

## 2020-10-22 DIAGNOSIS — M54.40 CHRONIC BILATERAL LOW BACK PAIN WITH SCIATICA, SCIATICA LATERALITY UNSPECIFIED: ICD-10-CM

## 2020-10-22 DIAGNOSIS — M54.40 CHRONIC RIGHT-SIDED LOW BACK PAIN WITH SCIATICA, SCIATICA LATERALITY UNSPECIFIED: ICD-10-CM

## 2020-10-22 DIAGNOSIS — G89.29 CHRONIC BILATERAL LOW BACK PAIN WITH SCIATICA, SCIATICA LATERALITY UNSPECIFIED: ICD-10-CM

## 2020-10-22 PROCEDURE — 95909 NRV CNDJ TST 5-6 STUDIES: CPT | Performed by: OTHER

## 2020-10-22 PROCEDURE — 95886 MUSC TEST DONE W/N TEST COMP: CPT | Performed by: OTHER

## 2020-10-22 NOTE — PROCEDURES
[facility]  [address] [facility]  Phone- [phone]  Nerve Conduction & Electromyography Report            Patient: Aimee Kc  Patient ID: [id number]  Sex: Female  YOB: 1940  Age: [de-identified] L. VAST MEDIALIS N None None None None N N N N   L. PERON LONGUS 2+ 2+ 2+ None None N N N Discrete   L. TIB POSTERIOR N None None None None N N N N   R. TIB ANTERIOR 1+ 1+ None None None N N N Reduced   R.  GASTROCN (MED) 2+ None 2+ None None N N N Reduced

## 2020-10-27 ENCOUNTER — LAB ENCOUNTER (OUTPATIENT)
Dept: LAB | Age: 80
End: 2020-10-27
Attending: FAMILY MEDICINE
Payer: MEDICARE

## 2020-10-27 DIAGNOSIS — Z13.29 SCREENING FOR ENDOCRINE, METABOLIC AND IMMUNITY DISORDER: ICD-10-CM

## 2020-10-27 DIAGNOSIS — I10 ESSENTIAL HYPERTENSION: ICD-10-CM

## 2020-10-27 DIAGNOSIS — Z13.0 SCREENING FOR ENDOCRINE, METABOLIC AND IMMUNITY DISORDER: ICD-10-CM

## 2020-10-27 DIAGNOSIS — Z13.228 SCREENING FOR ENDOCRINE, METABOLIC AND IMMUNITY DISORDER: ICD-10-CM

## 2020-10-27 PROCEDURE — 80053 COMPREHEN METABOLIC PANEL: CPT

## 2020-10-27 PROCEDURE — 36415 COLL VENOUS BLD VENIPUNCTURE: CPT

## 2020-10-28 ENCOUNTER — OFFICE VISIT (OUTPATIENT)
Dept: PAIN CLINIC | Facility: CLINIC | Age: 80
End: 2020-10-28
Payer: MEDICARE

## 2020-10-28 ENCOUNTER — TELEPHONE (OUTPATIENT)
Dept: SURGERY | Facility: CLINIC | Age: 80
End: 2020-10-28

## 2020-10-28 VITALS
BODY MASS INDEX: 27.48 KG/M2 | DIASTOLIC BLOOD PRESSURE: 78 MMHG | HEART RATE: 84 BPM | SYSTOLIC BLOOD PRESSURE: 132 MMHG | WEIGHT: 140 LBS | OXYGEN SATURATION: 97 % | HEIGHT: 60 IN

## 2020-10-28 DIAGNOSIS — M54.16 LUMBAR RADICULOPATHY: Primary | ICD-10-CM

## 2020-10-28 PROCEDURE — 99213 OFFICE O/P EST LOW 20 MIN: CPT | Performed by: ANESTHESIOLOGY

## 2020-10-28 NOTE — TELEPHONE ENCOUNTER
Per daughter, Breann Asp, [employer] the secondary does not require prior auth, states that they follow Medicare guidelines. Therefore, per daughter, they would like to schedule the injections ASAP. Please call.

## 2020-10-28 NOTE — TELEPHONE ENCOUNTER
Medical clearance needed- No    Implanted cardiac device/SCS/PNS: NA    Pt seen in OV today by Dr. Tal Núñez and recommended for TLESI (X 1). Laterality: Left  Level(s): L4-5, L5-S1    All procedural instructions reviewed, procedure line number provided.   Pt

## 2020-10-28 NOTE — PROGRESS NOTES
Name: Tayo Dudley   : 1/3/1940   DOS: 10/28/2020     Pain Clinic Follow Up Visit:   Patient presents with:  Convenient Care F/U      Tayo Dudley is a [de-identified]year old female with a history of left-sided low back pain with radicular symptoms, here t ursodiol 300 MG Oral Cap TAKE ONE CAPSULE BY MOUTH THREE TIMES DAILY 270 capsule 3   • losartan 100 MG Oral Tab Take 1 tablet (100 mg total) by mouth once daily. 90 tablet 3   • magnesium oxide 400 MG Oral Tab Take 400 mg by mouth.      • Calcium 500-125 MG

## 2020-10-28 NOTE — TELEPHONE ENCOUNTER
Spoke with Zachery Hurtado, pts dtr (OK per [facility]) Patient scheduled for procedure, pre-procedure instructions reviewed. Patient prefers local sedation. Reviewed sedation instructions including no need to be fasted or have a ride.  Per Dr. Vielka Hernandez, pt does not need to hol ? Agrylin (Anagrelide) 10 days  ? Brilinta (Ticagrelor) 7 days  ? Imbruvica (Ibrutinib) 3 days   ? Enbrel (Etanercept) 24 hours   ? Fragmin (Dalteparin) 24 hours   ? Pletal (Cilostazol) 7 days  ? Effient (Prasugrel) 7 days  ? Pradaxa 10 days  ?  Trental 7 d If you are a diabetic, please increase the frequency of your glucose monitoring after the procedure as this may cause a temporary increase in your blood sugar.   Contact your primary care physician if your blood sugar rises as you may require some medicatio

## 2020-10-28 NOTE — PROGRESS NOTES
Patient presents in office today with reported pain in left side lower back, left hamstring    Current pain level reported = 8/10    Last reported dose of traMADol HCl 50 MG Oral Tab two days prior to OV

## 2020-10-29 ENCOUNTER — HOSPITAL ENCOUNTER (OUTPATIENT)
Facility: HOSPITAL | Age: 80
Setting detail: HOSPITAL OUTPATIENT SURGERY
Discharge: HOME | End: 2020-10-29
Attending: ANESTHESIOLOGY | Admitting: ANESTHESIOLOGY
Payer: MEDICARE

## 2020-10-29 ENCOUNTER — APPOINTMENT (OUTPATIENT)
Dept: GENERAL RADIOLOGY | Facility: HOSPITAL | Age: 80
End: 2020-10-29
Attending: ANESTHESIOLOGY
Payer: MEDICARE

## 2020-10-29 VITALS
RESPIRATION RATE: 14 BRPM | DIASTOLIC BLOOD PRESSURE: 47 MMHG | OXYGEN SATURATION: 95 % | SYSTOLIC BLOOD PRESSURE: 138 MMHG | TEMPERATURE: 98 F | HEART RATE: 66 BPM

## 2020-10-29 DIAGNOSIS — M54.16 LUMBAR RADICULOPATHY: ICD-10-CM

## 2020-10-29 PROCEDURE — 3E0R33Z INTRODUCTION OF ANTI-INFLAMMATORY INTO SPINAL CANAL, PERCUTANEOUS APPROACH: ICD-10-PCS | Performed by: ANESTHESIOLOGY

## 2020-10-29 RX ORDER — ONDANSETRON 2 MG/ML
4 INJECTION INTRAMUSCULAR; INTRAVENOUS ONCE AS NEEDED
Status: DISCONTINUED | OUTPATIENT
Start: 2020-10-29 | End: 2020-10-29

## 2020-10-29 RX ORDER — DIPHENHYDRAMINE HYDROCHLORIDE 50 MG/ML
50 INJECTION INTRAMUSCULAR; INTRAVENOUS ONCE AS NEEDED
Status: DISCONTINUED | OUTPATIENT
Start: 2020-10-29 | End: 2020-10-29

## 2020-10-29 RX ORDER — LIDOCAINE HYDROCHLORIDE 10 MG/ML
INJECTION, SOLUTION EPIDURAL; INFILTRATION; INTRACAUDAL; PERINEURAL AS NEEDED
Status: DISCONTINUED | OUTPATIENT
Start: 2020-10-29 | End: 2020-10-29

## 2020-10-29 RX ORDER — SODIUM CHLORIDE 9 MG/ML
INJECTION INTRAVENOUS AS NEEDED
Status: DISCONTINUED | OUTPATIENT
Start: 2020-10-29 | End: 2020-10-29

## 2020-10-29 RX ORDER — DEXAMETHASONE SODIUM PHOSPHATE 10 MG/ML
INJECTION, SOLUTION INTRAMUSCULAR; INTRAVENOUS AS NEEDED
Status: DISCONTINUED | OUTPATIENT
Start: 2020-10-29 | End: 2020-10-29

## 2020-10-29 NOTE — OPERATIVE REPORT
[facility]  Operative Report  10/29/2020     Bartolome Fees Patient Status:  Hospital Outpatient Surgery    1/3/1940 MRN [id number]   [facility] ENDOSCOPY Attending Nazanin Cagle MD   Hosp Day # 0 PCP Zack Maldonado DO     Indication: the nerve root was obtained. At this point, 2 cc of normal saline with 10 mg of dexamethasone was injected without complication. The needle was withdrawn with stylet in situ. The patient tolerated procedure very well.   The patient was observed until disc

## 2020-10-29 NOTE — H&P
History & Physical Examination    Patient Name: Debra Edgar  MRN: [id number]  CSN: [id number]  YOB: 1940    Pre-Operative Diagnosis:  Lumbar radiculopathy [M54.16]    Present Illness: Patient with lumbar radiculopathy for transforaminal ep Disp: , Rfl:       No current facility-administered medications for this encounter.        Allergies:   Bee Venom               SWELLING  Allergy                     Comment:environmental  Bee                         Comment:Sting    Past Medical History: within the last 30 days. Any changes noted above.     Trey Bertrand MD

## 2020-11-04 DIAGNOSIS — G89.29 CHRONIC LEFT-SIDED LOW BACK PAIN WITH SCIATICA, SCIATICA LATERALITY UNSPECIFIED: ICD-10-CM

## 2020-11-04 DIAGNOSIS — M54.40 CHRONIC LEFT-SIDED LOW BACK PAIN WITH SCIATICA, SCIATICA LATERALITY UNSPECIFIED: ICD-10-CM

## 2020-11-04 RX ORDER — GABAPENTIN 100 MG/1
300 CAPSULE ORAL 3 TIMES DAILY
Qty: 1 CAPSULE | Refills: 0 | OUTPATIENT
Start: 2020-11-04

## 2020-11-04 NOTE — TELEPHONE ENCOUNTER
Rx Request  gabapentin 100 MG Oral Cap    Disp:                    R:      Associated Dx:    Last Refilled:    Last Visit:    Protocol Passed?  Qamar Drought  ]       No[  ]

## 2020-11-07 ENCOUNTER — APPOINTMENT (OUTPATIENT)
Dept: GENERAL RADIOLOGY | Age: 80
End: 2020-11-07
Attending: EMERGENCY MEDICINE
Payer: MEDICARE

## 2020-11-07 ENCOUNTER — APPOINTMENT (OUTPATIENT)
Dept: ULTRASOUND IMAGING | Age: 80
End: 2020-11-07
Attending: EMERGENCY MEDICINE
Payer: MEDICARE

## 2020-11-07 ENCOUNTER — HOSPITAL ENCOUNTER (EMERGENCY)
Age: 80
Discharge: HOME | End: 2020-11-07
Attending: EMERGENCY MEDICINE
Payer: MEDICARE

## 2020-11-07 VITALS
RESPIRATION RATE: 16 BRPM | HEART RATE: 75 BPM | HEIGHT: 60 IN | WEIGHT: 139 LBS | OXYGEN SATURATION: 99 % | DIASTOLIC BLOOD PRESSURE: 71 MMHG | TEMPERATURE: 98 F | BODY MASS INDEX: 27.29 KG/M2 | SYSTOLIC BLOOD PRESSURE: 144 MMHG

## 2020-11-07 DIAGNOSIS — I80.02 THROMBOPHLEBITIS OF SUPERFICIAL VEINS OF LEFT LOWER EXTREMITY: Primary | ICD-10-CM

## 2020-11-07 PROCEDURE — 73630 X-RAY EXAM OF FOOT: CPT | Performed by: EMERGENCY MEDICINE

## 2020-11-07 PROCEDURE — 99284 EMERGENCY DEPT VISIT MOD MDM: CPT

## 2020-11-07 PROCEDURE — 93971 EXTREMITY STUDY: CPT | Performed by: EMERGENCY MEDICINE

## 2020-11-07 PROCEDURE — 73610 X-RAY EXAM OF ANKLE: CPT | Performed by: EMERGENCY MEDICINE

## 2020-11-07 NOTE — ED PROVIDER NOTES
Patient Seen in: [facility] Emergency Department In [place]      History   Patient presents with:  Deep Vein Thrombosis    Stated Complaint: swollen left foot, hx of broken pelvis in 8/20 sent by pmd for r/o dvt    HPI    Patient is a pleasant [de-identified]year-old Positive for stated complaint: swollen left foot, hx of broken pelvis in 8/20 sent by pmd for r/o dvt  Other systems are as noted in HPI. Constitutional and vital signs reviewed. All other systems reviewed and negative except as noted above.     Physi PROCEDURE:  XR ANKLE (MIN 3 VIEWS), LEFT (CPT=73610)  TECHNIQUE:  Three views were obtained. COMPARISON:  None.   INDICATIONS:  swollen left foot, hx of broken pelvis in 8/20 sent by pmd for r/o dvt  PATIENT STATED HISTORY: (As transcribed by Technologist) PROCEDURE:  US VENOUS DOPPLER LEG LEFT - DIAG IMG (CPT=93971)  COMPARISON:  None. INDICATIONS:  eval for DVT  TECHNIQUE:  Real time, grey scale, and duplex ultrasound was used to evaluate the lower extremity venous system.  B-mode two-dimensional images of Patient verbalizes understanding and is comfortable with the plan as recommended. Patient ambulated freely and was subsequently discharged without incident. Disposition and Plan     Clinical Impression:   Thrombophlebitis of superficial veins o

## 2020-11-08 PROBLEM — H52.4 PRESBYOPIA: Status: RESOLVED | Noted: 2017-08-31 | Resolved: 2020-11-08

## 2020-11-08 PROBLEM — H52.229 REGULAR ASTIGMATISM: Status: RESOLVED | Noted: 2019-03-14 | Resolved: 2020-11-08

## 2020-11-08 PROBLEM — H52.00 HYPERMETROPIA: Status: RESOLVED | Noted: 2019-03-14 | Resolved: 2020-11-08

## 2020-11-09 ENCOUNTER — TELEMEDICINE (OUTPATIENT)
Dept: FAMILY MEDICINE CLINIC | Facility: CLINIC | Age: 80
End: 2020-11-09

## 2020-11-09 VITALS
HEIGHT: 60 IN | WEIGHT: 139 LBS | DIASTOLIC BLOOD PRESSURE: 71 MMHG | BODY MASS INDEX: 27.29 KG/M2 | SYSTOLIC BLOOD PRESSURE: 144 MMHG

## 2020-11-09 DIAGNOSIS — D31.32 CHOROIDAL NEVUS OF LEFT EYE: ICD-10-CM

## 2020-11-09 DIAGNOSIS — M54.16 LUMBAR RADICULOPATHY: ICD-10-CM

## 2020-11-09 DIAGNOSIS — L71.9 ROSACEA: ICD-10-CM

## 2020-11-09 DIAGNOSIS — Z00.00 ENCOUNTER FOR ANNUAL HEALTH EXAMINATION: Primary | ICD-10-CM

## 2020-11-09 DIAGNOSIS — I10 ESSENTIAL HYPERTENSION: ICD-10-CM

## 2020-11-09 DIAGNOSIS — E87.6 LOW POTASSIUM SYNDROME: ICD-10-CM

## 2020-11-09 DIAGNOSIS — G89.29 OTHER CHRONIC BACK PAIN: ICD-10-CM

## 2020-11-09 DIAGNOSIS — G89.29 CHRONIC LEFT-SIDED LOW BACK PAIN WITH SCIATICA, SCIATICA LATERALITY UNSPECIFIED: ICD-10-CM

## 2020-11-09 DIAGNOSIS — Z86.010 HISTORY OF COLON POLYPS: ICD-10-CM

## 2020-11-09 DIAGNOSIS — H04.123 DRY EYES: ICD-10-CM

## 2020-11-09 DIAGNOSIS — M94.9 DISORDER OF BONE AND CARTILAGE: ICD-10-CM

## 2020-11-09 DIAGNOSIS — I10 BENIGN ESSENTIAL HYPERTENSION: ICD-10-CM

## 2020-11-09 DIAGNOSIS — M54.9 OTHER CHRONIC BACK PAIN: ICD-10-CM

## 2020-11-09 DIAGNOSIS — C44.90 MALIGNANT NEOPLASM OF SKIN: ICD-10-CM

## 2020-11-09 DIAGNOSIS — E78.00 ELEVATED CHOLESTEROL: ICD-10-CM

## 2020-11-09 DIAGNOSIS — R10.12 LUQ ABDOMINAL PAIN: ICD-10-CM

## 2020-11-09 DIAGNOSIS — M81.8 OTHER OSTEOPOROSIS WITHOUT CURRENT PATHOLOGICAL FRACTURE: ICD-10-CM

## 2020-11-09 DIAGNOSIS — Z96.1 BILATERAL PSEUDOPHAKIA: ICD-10-CM

## 2020-11-09 DIAGNOSIS — S32.502D CLOSED NONDISPLACED FRACTURE OF LEFT PUBIS WITH ROUTINE HEALING: ICD-10-CM

## 2020-11-09 DIAGNOSIS — R92.8 ABNORMAL MAMMOGRAM: ICD-10-CM

## 2020-11-09 DIAGNOSIS — Z85.828 HISTORY OF SKIN CANCER: ICD-10-CM

## 2020-11-09 DIAGNOSIS — K83.01 PRIMARY SCLEROSING CHOLANGITIS: ICD-10-CM

## 2020-11-09 DIAGNOSIS — H35.3130 BILATERAL NONEXUDATIVE AGE-RELATED MACULAR DEGENERATION, UNSPECIFIED STAGE: ICD-10-CM

## 2020-11-09 DIAGNOSIS — H90.5 CENTRAL HEARING LOSS: ICD-10-CM

## 2020-11-09 DIAGNOSIS — G47.09 OTHER INSOMNIA: ICD-10-CM

## 2020-11-09 DIAGNOSIS — M89.9 DISORDER OF BONE AND CARTILAGE: ICD-10-CM

## 2020-11-09 DIAGNOSIS — K44.9 DIAPHRAGMATIC HERNIA WITHOUT OBSTRUCTION OR GANGRENE: ICD-10-CM

## 2020-11-09 DIAGNOSIS — M54.40 CHRONIC LEFT-SIDED LOW BACK PAIN WITH SCIATICA, SCIATICA LATERALITY UNSPECIFIED: ICD-10-CM

## 2020-11-09 DIAGNOSIS — K21.00 GASTROESOPHAGEAL REFLUX DISEASE WITH ESOPHAGITIS WITHOUT HEMORRHAGE: ICD-10-CM

## 2020-11-09 DIAGNOSIS — B00.9 HSV INFECTION: ICD-10-CM

## 2020-11-09 PROCEDURE — G0439 PPPS, SUBSEQ VISIT: HCPCS | Performed by: FAMILY MEDICINE

## 2020-11-09 PROCEDURE — 99213 OFFICE O/P EST LOW 20 MIN: CPT | Performed by: FAMILY MEDICINE

## 2020-11-09 RX ORDER — URSODIOL 300 MG/1
CAPSULE ORAL
Qty: 270 CAPSULE | Refills: 3 | Status: SHIPPED | OUTPATIENT
Start: 2020-11-09 | End: 2021-11-01

## 2020-11-09 RX ORDER — GABAPENTIN 100 MG/1
300 CAPSULE ORAL 3 TIMES DAILY
Qty: 270 CAPSULE | Refills: 1 | Status: SHIPPED | OUTPATIENT
Start: 2020-11-09 | End: 2021-01-11

## 2020-11-09 RX ORDER — DICLOFENAC SODIUM 75 MG/1
75 TABLET, DELAYED RELEASE ORAL 2 TIMES DAILY
Qty: 180 TABLET | Refills: 1 | Status: SHIPPED | OUTPATIENT
Start: 2020-11-09 | End: 2020-12-01

## 2020-11-09 RX ORDER — HYDROCHLOROTHIAZIDE 25 MG/1
25 TABLET ORAL DAILY
Qty: 90 TABLET | Refills: 1 | Status: SHIPPED | OUTPATIENT
Start: 2020-11-09 | End: 2021-04-12

## 2020-11-09 RX ORDER — LOSARTAN POTASSIUM 100 MG/1
100 TABLET ORAL
Qty: 90 TABLET | Refills: 3 | Status: SHIPPED | OUTPATIENT
Start: 2020-11-09 | End: 2021-11-01

## 2020-11-09 RX ORDER — ZOLPIDEM TARTRATE 10 MG/1
10 TABLET ORAL NIGHTLY
Qty: 90 TABLET | Refills: 1 | Status: SHIPPED | OUTPATIENT
Start: 2020-11-09 | End: 2021-05-10

## 2020-11-16 NOTE — PROGRESS NOTES
HPI:   Delbert Mojica is a [de-identified]year old female who presents for a Medicare Subsequent Annual Wellness visit (Pt already had Initial Annual Wellness).     Pt also here with    Rosacea     Vitamin deficiency     Essential hypertension     HSV infection screening of functional status. No data recorded  No data recorded  No data recorded      She has Driving difficulties based on screening of functional status.    Driving: (P) Cannot do without help   She has Meal Preparation difficulties based on screeni GYNECOLOGY)  Essence Carroll MD (DERMATOLOGY)  Debra Robertson, PT as Physical Therapist (Physical Therapy)  Irasema Gutiérrez DO as Consulting Physician (NEUROLOGY)    Patient Active Problem List:     Rosacea- stable      V Value Date    WBC 8.6 10/03/2020    HGB 13.0 10/03/2020    .0 10/03/2020        ALLERGIES:   She is allergic to bee venom; allergy; and bee.     CURRENT MEDICATIONS:     •  Zolpidem Tartrate 10 MG Oral Tab, Take 1 tablet (10 mg total) by mouth nightl SYSTEMS:   GENERAL: feels well otherwise  SKIN: denies any unusual skin lesions  EYES: denies blurred vision or double vision  HEENT: denies nasal congestion, sinus pain or ST  LUNGS: denies shortness of breath with exertion  CARDIOVASCULAR: denies chest p macular degeneration, unspecified stage- see eye specialist   -     OFFICE/OUTPT VISIT,ELIZABETH AGUILAR III    Bilateral pseudophakia- see eye specialist   -     OFFICE/OUTPT VISIT,EST,LEVL III    Central hearing loss- monitor   -     OFFICE/OUTPT VISIT,EST,LEVL I VISIT,EST,LEVL III    Primary sclerosing cholangitis- see gi   -     ursodiol 300 MG Oral Cap; TAKE ONE CAPSULE BY MOUTH THREE TIMES DAILY  -     OFFICE/OUTPT VISIT,EST,LEVL III    Rosacea- stable monitor   -     OFFICE/OUTPT VISIT,ELIZABETH AGUILAR III    Chronic 110 (H)   04/02/2013 95          Cardiovascular Disease Screening     LDL Annually LDL Cholesterol (mg/dL)   Date Value   10/03/2020 139 (H)     LDL-CHOLESTEROL (mg/dL (calc))   Date Value   04/05/2011 116     LDL CHOLESTROL (mg/dL)   Date Value   10/25/20 Medium/high risk factors:   End-stage renal disease   Hemophiliacs who received Factor VIII or IX concentrates   Clients of institutions for the mentally retarded   Persons who live in the same house as a HepB virus carrier   Homosexual men   Illicit injec

## 2020-11-16 NOTE — PATIENT INSTRUCTIONS
Lyle Azevedo's SCREENING SCHEDULE   Tests on this list are recommended by your physician but may not be covered, or covered at this frequency, by your insurer. Please check with your insurance carrier before scheduling to verify coverage.    NATY screening (once between ages 73-68) IPPE only No results found for this or any previous visit.  Limited to patients who meet one of the following criteria:   • Men who are 73-68 years old and have smoked more than 100 cigarettes in their lifetime   • Anyone Annually to at least age 76, and as needed after 76 There are no preventive care reminders to display for this patient. Please get this Mammogram regularly   Immunizations      Influenza  Covered Annually No orders found for this or any previous visit.  Ple available in [address])  [email or web address]  This link also has information from the [address] [place] [address] regarding Advance Directives.

## 2020-12-01 ENCOUNTER — OFFICE VISIT (OUTPATIENT)
Dept: NEUROLOGY | Facility: CLINIC | Age: 80
End: 2020-12-01
Payer: MEDICARE

## 2020-12-01 VITALS
BODY MASS INDEX: 28 KG/M2 | DIASTOLIC BLOOD PRESSURE: 72 MMHG | WEIGHT: 141 LBS | SYSTOLIC BLOOD PRESSURE: 118 MMHG | RESPIRATION RATE: 14 BRPM | HEART RATE: 60 BPM

## 2020-12-01 DIAGNOSIS — M54.16 LUMBAR RADICULOPATHY: Primary | ICD-10-CM

## 2020-12-01 DIAGNOSIS — R29.898 LEFT HAND WEAKNESS: ICD-10-CM

## 2020-12-01 PROCEDURE — 99213 OFFICE O/P EST LOW 20 MIN: CPT | Performed by: OTHER

## 2020-12-01 NOTE — PROGRESS NOTES
Neurology H&P    Eileen Bales Patient Status:  No patient class for patient encounter    1/3/1940 MRN [id number]   Location [address] [place] [address], [address] [place] [address], [address] [place] [address] Attending No att. providers found   Hosp Day # 0 PCP DO Genevieve Higuera tube. She denies any numbness or tingling in the hand. No radiating pains in the neck. She denies nay pains or symptoms in the RUE. She states that she cannot type any longer with the left hand. As for low back pains she states that she is doing better.  Sh fracture     Primary sclerosing cholangitis     Bilateral nonexudative age-related macular degeneration     Bilateral pseudophakia     Calculus of gallbladder     Central hearing loss     Choroidal nevus of left eye     Dry eyes     Primary biliary cirrhos negative, except for pertinent positive and negatives stated in subjective. Objective/Physical Exam:    Vital Signs:  Blood pressure 118/72, pulse 60, resp. rate 14, weight 141 lb (64 kg), last menstrual period 11/10/2013, not currently breastfeeding. foraminal narrowing at L5-S1 as above. L Thigh MRI 8/27/20  =====  CONCLUSION:    1. There are mild to moderate muscular strains of the left gluteus minimus and medius muscles with associated atrophic changes as well with fatty infiltration.   2. Mild

## 2020-12-02 ENCOUNTER — LAB ENCOUNTER (OUTPATIENT)
Dept: LAB | Age: 80
End: 2020-12-02
Attending: FAMILY MEDICINE
Payer: MEDICARE

## 2020-12-02 DIAGNOSIS — E87.6 HYPOKALEMIA: ICD-10-CM

## 2020-12-02 PROCEDURE — 36415 COLL VENOUS BLD VENIPUNCTURE: CPT

## 2020-12-02 PROCEDURE — 80053 COMPREHEN METABOLIC PANEL: CPT

## 2020-12-04 ENCOUNTER — OFFICE VISIT (OUTPATIENT)
Dept: ORTHOPEDICS CLINIC | Facility: CLINIC | Age: 80
End: 2020-12-04
Payer: MEDICARE

## 2020-12-04 VITALS — OXYGEN SATURATION: 98 % | HEART RATE: 62 BPM

## 2020-12-04 DIAGNOSIS — S32.502D CLOSED FRACTURE OF LEFT PUBIS WITH ROUTINE HEALING, UNSPECIFIED PORTION OF PUBIS, SUBSEQUENT ENCOUNTER: Primary | ICD-10-CM

## 2020-12-04 PROCEDURE — 99212 OFFICE O/P EST SF 10 MIN: CPT | Performed by: ORTHOPAEDIC SURGERY

## 2020-12-04 NOTE — PROGRESS NOTES
[facility] Progress Note    Subjective: Patient returns with her daughter for reevaluation following lateral compression pelvis fracture from low-energy fall.   They state that she was flexing her hip a month ago, when she felt a pop and her symptoms previously discussed. Advised continued home exercises that she has been doing. She may attempt to start sleeping on her side again, also may use a thick sock for excessive pressure on the back of the heel while sleeping.   If she has any issues or concer

## 2020-12-09 ENCOUNTER — PATIENT MESSAGE (OUTPATIENT)
Dept: PAIN CLINIC | Facility: CLINIC | Age: 80
End: 2020-12-09

## 2020-12-28 ENCOUNTER — TELEPHONE (OUTPATIENT)
Dept: PAIN CLINIC | Facility: CLINIC | Age: 80
End: 2020-12-28

## 2020-12-28 DIAGNOSIS — M54.16 LUMBAR RADICULOPATHY: Primary | ICD-10-CM

## 2020-12-28 NOTE — TELEPHONE ENCOUNTER
SEE PATIENT MESSAGE FROM 12/9    Medical clearance needed- No    Implanted cardiac device/SCS/PNS: NA    Pt recommended for repeat TLESI, per Dr. Dulce Sanchez: Left  Level(s): L4-5, L5-S1    All procedural instructions reviewed, procedure line number

## 2020-12-28 NOTE — TELEPHONE ENCOUNTER
Patient scheduled for LEFT LUMBAR 4-5, LUMBAR 5-SACRAL 1 TRANSFORAMINAL EPIDURAL STEROID INJECTION WITH LOCAL 1/5/2021

## 2020-12-28 NOTE — TELEPHONE ENCOUNTER
Spoke with Larry Alcantar, pts daughter, patient scheduled for a pain management procedure, pre-procedure instructions reviewed.     Larry Alcantar advised procedure will be done under local anesthesia, no requirement for patient to have a ride or have been fasted prior to p Number of days you need to be off for the following medications:  ? Aggrenox 10 days   ? Agrylin (Anagrelide) 10 days  ? Brilinta (Ticagrelor) 7 days  ? Imbruvica (Ibrutinib) 3 days   ? Enbrel (Etanercept) 24 hours   ? Fragmin (Dalteparin) 24 hours   ?  Pl Please call our office with any questions or concerns before or after your procedure at  [phone].   If you are a diabetic, please increase the frequency of your glucose monitoring after the procedure as this may cause a temporary increase in your blood

## 2021-01-05 ENCOUNTER — HOSPITAL ENCOUNTER (OUTPATIENT)
Facility: HOSPITAL | Age: 81
Setting detail: HOSPITAL OUTPATIENT SURGERY
Discharge: HOME | End: 2021-01-05
Attending: ANESTHESIOLOGY | Admitting: ANESTHESIOLOGY
Payer: MEDICARE

## 2021-01-05 ENCOUNTER — APPOINTMENT (OUTPATIENT)
Dept: GENERAL RADIOLOGY | Facility: HOSPITAL | Age: 81
End: 2021-01-05
Attending: ANESTHESIOLOGY
Payer: MEDICARE

## 2021-01-05 VITALS
RESPIRATION RATE: 14 BRPM | TEMPERATURE: 98 F | DIASTOLIC BLOOD PRESSURE: 68 MMHG | SYSTOLIC BLOOD PRESSURE: 151 MMHG | HEART RATE: 67 BPM | OXYGEN SATURATION: 100 %

## 2021-01-05 DIAGNOSIS — M54.16 LUMBAR RADICULOPATHY: ICD-10-CM

## 2021-01-05 PROCEDURE — 3E0R33Z INTRODUCTION OF ANTI-INFLAMMATORY INTO SPINAL CANAL, PERCUTANEOUS APPROACH: ICD-10-PCS | Performed by: ANESTHESIOLOGY

## 2021-01-05 RX ORDER — DEXAMETHASONE SODIUM PHOSPHATE 10 MG/ML
INJECTION, SOLUTION INTRAMUSCULAR; INTRAVENOUS AS NEEDED
Status: DISCONTINUED | OUTPATIENT
Start: 2021-01-05 | End: 2021-01-05

## 2021-01-05 RX ORDER — DIPHENHYDRAMINE HYDROCHLORIDE 50 MG/ML
50 INJECTION INTRAMUSCULAR; INTRAVENOUS ONCE AS NEEDED
Status: CANCELLED | OUTPATIENT
Start: 2021-01-05 | End: 2021-01-05

## 2021-01-05 RX ORDER — LIDOCAINE HYDROCHLORIDE 10 MG/ML
INJECTION, SOLUTION EPIDURAL; INFILTRATION; INTRACAUDAL; PERINEURAL AS NEEDED
Status: DISCONTINUED | OUTPATIENT
Start: 2021-01-05 | End: 2021-01-05

## 2021-01-05 RX ORDER — ONDANSETRON 2 MG/ML
4 INJECTION INTRAMUSCULAR; INTRAVENOUS ONCE AS NEEDED
Status: CANCELLED | OUTPATIENT
Start: 2021-01-05 | End: 2021-01-05

## 2021-01-05 RX ORDER — SODIUM CHLORIDE 9 MG/ML
INJECTION INTRAVENOUS AS NEEDED
Status: DISCONTINUED | OUTPATIENT
Start: 2021-01-05 | End: 2021-01-05

## 2021-01-05 NOTE — H&P
History & Physical Examination    Patient Name: Antoinette Finley  MRN: [id number]  CSN: [id number]  YOB: 1940    Pre-Operative Diagnosis:  Lumbar radiculopathy [M54.16]    Present Illness: Patient with lumbar radiculopathy for transforaminal ep basal cell   • Esophageal reflux    • Hypertension    • Osteoporosis    • Primary biliary cirrhosis ([facility] [address]    • Scoliosis    • Uterine fibroid      Past Surgical History:   Procedure Laterality Date   • CATARACT  2009   • CHOLECYSTECTOMY  2006   • DILATION

## 2021-01-05 NOTE — OPERATIVE REPORT
[facility]  Operative Report  2021     Chau Coffman Patient Status:  Hospital Outpatient Surgery    1/3/1940 MRN [id number]   [facility] ENDOSCOPY Attending Desi Donaldson MD   Hosp Day # 0 PCP Ajit Murphy DO     Indication: Pa space and the nerve root was obtained. At this point, 2 cc of normal saline with 10 mg of dexamethasone was injected without complication. The needle was withdrawn with stylet in situ. The patient tolerated procedure very well.   The patient was observed

## 2021-01-06 DIAGNOSIS — G89.29 CHRONIC LEFT-SIDED LOW BACK PAIN WITH SCIATICA, SCIATICA LATERALITY UNSPECIFIED: ICD-10-CM

## 2021-01-06 DIAGNOSIS — M54.40 CHRONIC LEFT-SIDED LOW BACK PAIN WITH SCIATICA, SCIATICA LATERALITY UNSPECIFIED: ICD-10-CM

## 2021-01-07 RX ORDER — GABAPENTIN 100 MG/1
CAPSULE ORAL
Qty: 270 CAPSULE | Refills: 0 | OUTPATIENT
Start: 2021-01-07

## 2021-01-11 DIAGNOSIS — G89.29 CHRONIC LEFT-SIDED LOW BACK PAIN WITH SCIATICA, SCIATICA LATERALITY UNSPECIFIED: ICD-10-CM

## 2021-01-11 DIAGNOSIS — M54.40 CHRONIC LEFT-SIDED LOW BACK PAIN WITH SCIATICA, SCIATICA LATERALITY UNSPECIFIED: ICD-10-CM

## 2021-01-11 RX ORDER — GABAPENTIN 100 MG/1
300 CAPSULE ORAL 3 TIMES DAILY
Qty: 270 CAPSULE | Refills: 1 | Status: CANCELLED | OUTPATIENT
Start: 2021-01-11

## 2021-01-11 RX ORDER — GABAPENTIN 100 MG/1
CAPSULE ORAL
Qty: 270 CAPSULE | Refills: 0 | Status: SHIPPED | OUTPATIENT
Start: 2021-01-11 | End: 2021-01-21

## 2021-01-21 ENCOUNTER — VIRTUAL PHONE E/M (OUTPATIENT)
Dept: FAMILY MEDICINE CLINIC | Facility: CLINIC | Age: 81
End: 2021-01-21
Payer: MEDICARE

## 2021-01-21 DIAGNOSIS — M54.40 CHRONIC LEFT-SIDED LOW BACK PAIN WITH SCIATICA, SCIATICA LATERALITY UNSPECIFIED: Primary | ICD-10-CM

## 2021-01-21 DIAGNOSIS — E87.6 LOW SERUM POTASSIUM: ICD-10-CM

## 2021-01-21 DIAGNOSIS — G89.29 CHRONIC LEFT-SIDED LOW BACK PAIN WITH SCIATICA, SCIATICA LATERALITY UNSPECIFIED: Primary | ICD-10-CM

## 2021-01-21 DIAGNOSIS — N18.30 STAGE 3 CHRONIC KIDNEY DISEASE, UNSPECIFIED WHETHER STAGE 3A OR 3B CKD (HCC): ICD-10-CM

## 2021-01-21 PROCEDURE — 99442 PHONE E/M BY PHYS 11-20 MIN: CPT | Performed by: FAMILY MEDICINE

## 2021-01-21 RX ORDER — GABAPENTIN 300 MG/1
300 CAPSULE ORAL 3 TIMES DAILY
Qty: 270 CAPSULE | Refills: 3 | Status: SHIPPED | OUTPATIENT
Start: 2021-01-21

## 2021-01-21 NOTE — PROGRESS NOTES
HPI:   Daniella Reeves is a 80year old female here for follow up on chronic pain and low potassium and decrease in GFR     Pt reports her chronic pain is managed by gabpentin   Pt wants to remain on this after her pain management consult     Pt needs rep 5/11/2015    Performed by Hazel Montes MD at [address] [place] [address]   • MARIO LOCALIZATION WIRE 1 SITE LEFT (JGO=48577)  1960    BENIGN   • MARIO LOCALIZATION WIRE 1 SITE LEFT (CPT=19281)  1980    BENIGN   • NEEDLE BIOPSY RIGHT  7/2011    US guided bx pain with sciatica, sciatica laterality unspecified    - gabapentin 300 MG Oral Cap; Take 1 capsule (300 mg total) by mouth 3 (three) times daily. Dispense: 270 capsule; Refill: 3    2.  Stage 3 chronic kidney disease, unspecified whether stage 3a or 3b CK

## 2021-01-24 ENCOUNTER — PATIENT MESSAGE (OUTPATIENT)
Dept: FAMILY MEDICINE CLINIC | Facility: CLINIC | Age: 81
End: 2021-01-24

## 2021-02-02 ENCOUNTER — LAB ENCOUNTER (OUTPATIENT)
Dept: LAB | Age: 81
End: 2021-02-02
Attending: FAMILY MEDICINE
Payer: MEDICARE

## 2021-02-02 DIAGNOSIS — N18.30 STAGE 3 CHRONIC KIDNEY DISEASE, UNSPECIFIED WHETHER STAGE 3A OR 3B CKD (HCC): ICD-10-CM

## 2021-02-02 DIAGNOSIS — E87.6 LOW SERUM POTASSIUM: ICD-10-CM

## 2021-02-02 LAB
ALBUMIN SERPL-MCNC: 3.6 G/DL (ref 3.4–5)
ALBUMIN/GLOB SERPL: 0.9 {RATIO} (ref 1–2)
ALP LIVER SERPL-CCNC: 255 U/L
ALT SERPL-CCNC: 67 U/L
ANION GAP SERPL CALC-SCNC: 4 MMOL/L (ref 0–18)
AST SERPL-CCNC: 37 U/L (ref 15–37)
BILIRUB SERPL-MCNC: 0.4 MG/DL (ref 0.1–2)
BUN BLD-MCNC: 16 MG/DL (ref 7–18)
BUN/CREAT SERPL: 14.7 (ref 10–20)
CALCIUM BLD-MCNC: 10.1 MG/DL (ref 8.5–10.1)
CHLORIDE SERPL-SCNC: 106 MMOL/L (ref 98–112)
CO2 SERPL-SCNC: 29 MMOL/L (ref 21–32)
CREAT BLD-MCNC: 1.09 MG/DL
GLOBULIN PLAS-MCNC: 4 G/DL (ref 2.8–4.4)
GLUCOSE BLD-MCNC: 82 MG/DL (ref 70–99)
M PROTEIN MFR SERPL ELPH: 7.6 G/DL (ref 6.4–8.2)
OSMOLALITY SERPL CALC.SUM OF ELEC: 288 MOSM/KG (ref 275–295)
PATIENT FASTING Y/N/NP: YES
POTASSIUM SERPL-SCNC: 3.9 MMOL/L (ref 3.5–5.1)
SODIUM SERPL-SCNC: 139 MMOL/L (ref 136–145)

## 2021-02-02 PROCEDURE — 80053 COMPREHEN METABOLIC PANEL: CPT

## 2021-02-02 PROCEDURE — 36415 COLL VENOUS BLD VENIPUNCTURE: CPT

## 2021-03-09 DIAGNOSIS — Z23 NEED FOR VACCINATION: ICD-10-CM

## 2021-04-10 DIAGNOSIS — I10 ESSENTIAL HYPERTENSION: ICD-10-CM

## 2021-04-12 RX ORDER — HYDROCHLOROTHIAZIDE 25 MG/1
TABLET ORAL
Qty: 90 TABLET | Refills: 0 | Status: SHIPPED | OUTPATIENT
Start: 2021-04-12 | End: 2021-07-14 | Stop reason: ALTCHOICE

## 2021-05-03 ENCOUNTER — TELEMEDICINE (OUTPATIENT)
Dept: FAMILY MEDICINE CLINIC | Facility: CLINIC | Age: 81
End: 2021-05-03
Payer: MEDICARE

## 2021-05-03 ENCOUNTER — PATIENT MESSAGE (OUTPATIENT)
Dept: FAMILY MEDICINE CLINIC | Facility: CLINIC | Age: 81
End: 2021-05-03

## 2021-05-03 DIAGNOSIS — R35.0 URINARY FREQUENCY: Primary | ICD-10-CM

## 2021-05-03 PROCEDURE — 99213 OFFICE O/P EST LOW 20 MIN: CPT | Performed by: INTERNAL MEDICINE

## 2021-05-03 RX ORDER — CIPROFLOXACIN 250 MG/1
250 TABLET, FILM COATED ORAL 2 TIMES DAILY
Qty: 14 TABLET | Refills: 0 | Status: SHIPPED | OUTPATIENT
Start: 2021-05-03 | End: 2021-05-10

## 2021-05-03 NOTE — PROGRESS NOTES
Phone Visit  Nancy Perry is a 80year old female. Visit: urinary frequency, dysuria    HPI:   Pt requests a phone visit due to the Covid pandemic to discuss bladder symptoms. She does not have internet service. 1 week of symptoms.   +frequency, up al POLYPS/FIBROIDS;  Surgeon: Dhaval Brady MD;  Location: [address] [place] [address]   • Samantha localization wire 1 site left (cpt=19281)  1960    BENIGN   • Samantha localization wire 1 site left (cpt=19281)  1980    BENIGN   • Needle biopsy right  7/2011    US plan.    Follow up if symptoms persist, pt agrees    Duration of visit: 10 min      Thelma Munroe understands a phone evaluation is not a substitute for face-to-face examination or emergency care.  Patient advised to go to ER or call 911 for worsening sy

## 2021-05-04 NOTE — TELEPHONE ENCOUNTER
From: Phil Spencer  To: Terrell Sinha DO  Sent: 5/3/2021 9:02 AM CDT  Subject: Non-Urgent Medical Question    ihave been urinating every hour during the night and have burning can you perscribe something idid the AZO test strip azotest strip and it cam

## 2021-05-10 DIAGNOSIS — G47.09 OTHER INSOMNIA: ICD-10-CM

## 2021-05-10 RX ORDER — ZOLPIDEM TARTRATE 10 MG/1
TABLET ORAL
Qty: 90 TABLET | Refills: 0 | Status: SHIPPED | OUTPATIENT
Start: 2021-05-10 | End: 2021-08-12

## 2021-05-26 ENCOUNTER — PATIENT MESSAGE (OUTPATIENT)
Dept: FAMILY MEDICINE CLINIC | Facility: CLINIC | Age: 81
End: 2021-05-26

## 2021-05-26 DIAGNOSIS — R79.89 LOW SERUM PROGESTERONE: Primary | ICD-10-CM

## 2021-05-26 NOTE — TELEPHONE ENCOUNTER
From: Pamela Lara  To: Mari Marquez DO  Sent: 5/26/2021 8:52 AM CDT  Subject: Non-Urgent Medical Question    Dr. Crystal Orozco had me taking a prescription for progestonr 100mg 2quesyions.  should i contue taking yhese and can you refill yhe the medication

## 2021-06-10 ENCOUNTER — TELEPHONE (OUTPATIENT)
Dept: FAMILY MEDICINE CLINIC | Facility: CLINIC | Age: 81
End: 2021-06-10

## 2021-06-10 ENCOUNTER — LAB ENCOUNTER (OUTPATIENT)
Dept: LAB | Age: 81
End: 2021-06-10
Attending: FAMILY MEDICINE
Payer: MEDICARE

## 2021-06-10 DIAGNOSIS — R35.0 FREQUENT URINATION: Primary | ICD-10-CM

## 2021-06-10 DIAGNOSIS — I10 ESSENTIAL HYPERTENSION: ICD-10-CM

## 2021-06-10 PROCEDURE — 36415 COLL VENOUS BLD VENIPUNCTURE: CPT

## 2021-06-10 PROCEDURE — 80053 COMPREHEN METABOLIC PANEL: CPT

## 2021-06-10 NOTE — TELEPHONE ENCOUNTER
Pt did AZO test strip otc, turns bright pink,    Frequent urination, burning, 3-4 times a year    Okay for urine cullture, u/a? Pt is at lab now.

## 2021-06-16 ENCOUNTER — LAB ENCOUNTER (OUTPATIENT)
Dept: LAB | Age: 81
End: 2021-06-16
Attending: FAMILY MEDICINE
Payer: MEDICARE

## 2021-06-16 DIAGNOSIS — R35.0 FREQUENT URINATION: ICD-10-CM

## 2021-06-16 PROCEDURE — 81003 URINALYSIS AUTO W/O SCOPE: CPT

## 2021-06-16 PROCEDURE — 87086 URINE CULTURE/COLONY COUNT: CPT

## 2021-07-07 ENCOUNTER — LAB ENCOUNTER (OUTPATIENT)
Dept: LAB | Age: 81
End: 2021-07-07
Attending: FAMILY MEDICINE
Payer: MEDICARE

## 2021-07-07 DIAGNOSIS — R74.01 ALT (SGPT) LEVEL RAISED: ICD-10-CM

## 2021-07-07 DIAGNOSIS — R79.89 CREATININE ELEVATION: ICD-10-CM

## 2021-07-07 DIAGNOSIS — R74.01 ELEVATED SGOT (AST): ICD-10-CM

## 2021-07-07 LAB
ALBUMIN SERPL-MCNC: 3.9 G/DL (ref 3.4–5)
ALBUMIN/GLOB SERPL: 1 {RATIO} (ref 1–2)
ALP LIVER SERPL-CCNC: 258 U/L
ALT SERPL-CCNC: 62 U/L
ANION GAP SERPL CALC-SCNC: 3 MMOL/L (ref 0–18)
AST SERPL-CCNC: 38 U/L (ref 15–37)
BILIRUB SERPL-MCNC: 0.6 MG/DL (ref 0.1–2)
BUN BLD-MCNC: 30 MG/DL (ref 7–18)
BUN/CREAT SERPL: 26.3 (ref 10–20)
CALCIUM BLD-MCNC: 9.9 MG/DL (ref 8.5–10.1)
CHLORIDE SERPL-SCNC: 103 MMOL/L (ref 98–112)
CO2 SERPL-SCNC: 32 MMOL/L (ref 21–32)
CREAT BLD-MCNC: 1.14 MG/DL
GLOBULIN PLAS-MCNC: 4 G/DL (ref 2.8–4.4)
GLUCOSE BLD-MCNC: 85 MG/DL (ref 70–99)
M PROTEIN MFR SERPL ELPH: 7.9 G/DL (ref 6.4–8.2)
OSMOLALITY SERPL CALC.SUM OF ELEC: 291 MOSM/KG (ref 275–295)
PATIENT FASTING Y/N/NP: YES
POTASSIUM SERPL-SCNC: 3.2 MMOL/L (ref 3.5–5.1)
SODIUM SERPL-SCNC: 138 MMOL/L (ref 136–145)

## 2021-07-07 PROCEDURE — 36415 COLL VENOUS BLD VENIPUNCTURE: CPT

## 2021-07-07 PROCEDURE — 80053 COMPREHEN METABOLIC PANEL: CPT

## 2021-07-08 ENCOUNTER — TELEPHONE (OUTPATIENT)
Dept: FAMILY MEDICINE CLINIC | Facility: CLINIC | Age: 81
End: 2021-07-08

## 2021-07-08 DIAGNOSIS — E87.6 HYPOKALEMIA: Primary | ICD-10-CM

## 2021-07-08 RX ORDER — POTASSIUM CHLORIDE 750 MG/1
10 TABLET, FILM COATED, EXTENDED RELEASE ORAL 2 TIMES DAILY
Qty: 20 TABLET | Refills: 0 | Status: SHIPPED | OUTPATIENT
Start: 2021-07-08 | End: 2021-08-26

## 2021-07-12 ENCOUNTER — PATIENT MESSAGE (OUTPATIENT)
Dept: FAMILY MEDICINE CLINIC | Facility: CLINIC | Age: 81
End: 2021-07-12

## 2021-07-12 NOTE — TELEPHONE ENCOUNTER
From: Jeremías Judge  To: Maida Guerrero DO  Sent: 7/12/2021 12:55 PM CDT  Subject: Non-Urgent Medical Question    iwant to stop taking gabapentin What should i do or know about reducing the dosage

## 2021-07-13 ENCOUNTER — TELEPHONE (OUTPATIENT)
Dept: PAIN CLINIC | Facility: CLINIC | Age: 81
End: 2021-07-13

## 2021-07-13 DIAGNOSIS — M54.16 LUMBAR RADICULOPATHY: Primary | ICD-10-CM

## 2021-07-13 NOTE — TELEPHONE ENCOUNTER
Pt's daughter, Loly Mendoza is calling wanting to schedule an appointment for an injection. Pt lives an Hour and 1/2 away and is would like Consult and Injection done on the same day if possible. Please advise.

## 2021-07-13 NOTE — TELEPHONE ENCOUNTER
Spoke with Yakov Garg, pts daughter, patient scheduled for a pain management procedure, pre-procedure instructions reviewed.     Yakov Garg advised procedure will be done under local anesthesia, no requirement for patient to have a ride or have been fasted prior to

## 2021-07-13 NOTE — TELEPHONE ENCOUNTER
Tracey Levi, pt's daughter, who is asking to repeat left TLESI. Asked Yakov Garg if pt's condition has changed at all. Yakov Garg states her typical sx are returning, pain in left low back radiating down back of left leg to the foot.  Confirmed that pain has alwa

## 2021-07-13 NOTE — TELEPHONE ENCOUNTER
Question Answer Comment   Anesthesia Type Local    Provider [facility]    Procedure Transforaminal    Laterality/Level left L4-5, L5-S1    Medical clearance requested (will send to Pain Navigator) No    Patient has Medicare coverage?  Yes    Commyenifer

## 2021-07-20 ENCOUNTER — APPOINTMENT (OUTPATIENT)
Dept: GENERAL RADIOLOGY | Facility: HOSPITAL | Age: 81
End: 2021-07-20
Attending: ANESTHESIOLOGY
Payer: MEDICARE

## 2021-07-20 ENCOUNTER — HOSPITAL ENCOUNTER (OUTPATIENT)
Facility: HOSPITAL | Age: 81
Setting detail: HOSPITAL OUTPATIENT SURGERY
Discharge: HOME | End: 2021-07-20
Attending: ANESTHESIOLOGY | Admitting: ANESTHESIOLOGY
Payer: MEDICARE

## 2021-07-20 VITALS
OXYGEN SATURATION: 100 % | RESPIRATION RATE: 18 BRPM | DIASTOLIC BLOOD PRESSURE: 75 MMHG | HEART RATE: 61 BPM | SYSTOLIC BLOOD PRESSURE: 146 MMHG | TEMPERATURE: 99 F

## 2021-07-20 DIAGNOSIS — M54.16 LUMBAR RADICULOPATHY: ICD-10-CM

## 2021-07-20 PROCEDURE — 3E0R33Z INTRODUCTION OF ANTI-INFLAMMATORY INTO SPINAL CANAL, PERCUTANEOUS APPROACH: ICD-10-PCS | Performed by: ANESTHESIOLOGY

## 2021-07-20 RX ORDER — SODIUM CHLORIDE, SODIUM LACTATE, POTASSIUM CHLORIDE, CALCIUM CHLORIDE 600; 310; 30; 20 MG/100ML; MG/100ML; MG/100ML; MG/100ML
100 INJECTION, SOLUTION INTRAVENOUS CONTINUOUS
Status: DISCONTINUED | OUTPATIENT
Start: 2021-07-20 | End: 2021-07-20

## 2021-07-20 RX ORDER — DEXAMETHASONE SODIUM PHOSPHATE 10 MG/ML
INJECTION, SOLUTION INTRAMUSCULAR; INTRAVENOUS
Status: DISCONTINUED | OUTPATIENT
Start: 2021-07-20 | End: 2021-07-20

## 2021-07-20 RX ORDER — SODIUM CHLORIDE 9 MG/ML
INJECTION INTRAVENOUS
Status: DISCONTINUED | OUTPATIENT
Start: 2021-07-20 | End: 2021-07-20

## 2021-07-20 RX ORDER — LIDOCAINE HYDROCHLORIDE 10 MG/ML
INJECTION, SOLUTION EPIDURAL; INFILTRATION; INTRACAUDAL; PERINEURAL
Status: DISCONTINUED | OUTPATIENT
Start: 2021-07-20 | End: 2021-07-20

## 2021-07-20 RX ORDER — ONDANSETRON 2 MG/ML
4 INJECTION INTRAMUSCULAR; INTRAVENOUS ONCE AS NEEDED
Status: CANCELLED | OUTPATIENT
Start: 2021-07-20 | End: 2021-07-20

## 2021-07-20 RX ORDER — ONDANSETRON 2 MG/ML
4 INJECTION INTRAMUSCULAR; INTRAVENOUS ONCE AS NEEDED
Status: DISCONTINUED | OUTPATIENT
Start: 2021-07-20 | End: 2021-07-20

## 2021-07-20 NOTE — OPERATIVE REPORT
[facility]  Operative Report  2021     Nancy Giselamana Patient Status:  Hospital Outpatient Surgery    1/3/1940 MRN [id number]   Location [id number] [place] [address] Attending Katie Khan MD   [facility] Day # 0 PCP Malon Crigler, DO epidural space and the nerve root was obtained. At this point, 2 cc of normal saline with 10 mg of dexamethasone was injected without complication. The needle was withdrawn with stylet in situ. The patient tolerated procedure very well.   The patient was

## 2021-07-20 NOTE — H&P
History & Physical Examination    Patient Name: Pamela Lara  MRN: [id number]  CSN: [id number]  YOB: 1940    Pre-Operative Diagnosis:  Lumbar radiculopathy [M54.16]    Present Illness: Patient with lumbar radiculopathy for transforaminal ep 100 mL/hr, Intravenous, Continuous  ondansetron HCl (ZOFRAN) injection 4 mg, 4 mg, Intravenous, Once PRN        Allergies:   Bee Venom               SWELLING  Allergy                     Comment:environmental  Bee                         Comment:Sting    P ]    LUNGS [x ] [x ]    ABDOMEN [x ] [x ]    UROGENITAL [x ] [x ]    EXTREMITIES [x ] [x ]    OTHER        [ x ] I have discussed the risks and benefits and alternatives with the patient/family. They understand and agree to proceed with plan of care.   [ x

## 2021-08-12 ENCOUNTER — TELEPHONE (OUTPATIENT)
Dept: ORTHOPEDICS CLINIC | Facility: CLINIC | Age: 81
End: 2021-08-12

## 2021-08-12 DIAGNOSIS — M25.552 LEFT HIP PAIN: Primary | ICD-10-CM

## 2021-08-12 DIAGNOSIS — G47.09 OTHER INSOMNIA: ICD-10-CM

## 2021-08-12 RX ORDER — ZOLPIDEM TARTRATE 10 MG/1
TABLET ORAL
Qty: 90 TABLET | Refills: 0 | Status: SHIPPED | OUTPATIENT
Start: 2021-08-12 | End: 2021-11-01

## 2021-08-12 NOTE — TELEPHONE ENCOUNTER
Patient is following up from left hip pelvis fracture. Xrays ordered, patient informed to arrive 30 mins earlier. Patient verbalized understanding.      LOV: 12/4/20 1 year follow up left hip

## 2021-08-12 NOTE — TELEPHONE ENCOUNTER
Patient has not had imaging in a while. They will be brnding a CD in from April. If additional imaging is needed. lease enter the appropriate RX and let the patient know to come in before their appointment to complete the additional imaging. Thank you!

## 2021-08-16 ENCOUNTER — HOSPITAL ENCOUNTER (OUTPATIENT)
Dept: GENERAL RADIOLOGY | Age: 81
Discharge: HOME | End: 2021-08-16
Attending: ORTHOPAEDIC SURGERY
Payer: MEDICARE

## 2021-08-16 ENCOUNTER — LAB ENCOUNTER (OUTPATIENT)
Dept: LAB | Age: 81
End: 2021-08-16
Attending: FAMILY MEDICINE
Payer: MEDICARE

## 2021-08-16 ENCOUNTER — OFFICE VISIT (OUTPATIENT)
Dept: ORTHOPEDICS CLINIC | Facility: CLINIC | Age: 81
End: 2021-08-16
Payer: MEDICARE

## 2021-08-16 VITALS — OXYGEN SATURATION: 97 % | HEART RATE: 72 BPM

## 2021-08-16 DIAGNOSIS — M25.552 LEFT HIP PAIN: ICD-10-CM

## 2021-08-16 DIAGNOSIS — M76.32 ILIOTIBIAL BAND TENDINITIS OF LEFT SIDE: Primary | ICD-10-CM

## 2021-08-16 DIAGNOSIS — E87.6 HYPOKALEMIA: ICD-10-CM

## 2021-08-16 LAB — POTASSIUM SERPL-SCNC: 3.3 MMOL/L (ref 3.5–5.1)

## 2021-08-16 PROCEDURE — 73502 X-RAY EXAM HIP UNI 2-3 VIEWS: CPT | Performed by: ORTHOPAEDIC SURGERY

## 2021-08-16 PROCEDURE — 36415 COLL VENOUS BLD VENIPUNCTURE: CPT

## 2021-08-16 PROCEDURE — 99213 OFFICE O/P EST LOW 20 MIN: CPT | Performed by: ORTHOPAEDIC SURGERY

## 2021-08-16 PROCEDURE — 84132 ASSAY OF SERUM POTASSIUM: CPT

## 2021-08-16 NOTE — PROGRESS NOTES
[facility] Progress Note    Subjective: Patient returns for follow-up on her left hip. States she has recovered since the fracture, but she has been having some increasing pain along the outside of her thigh.   She does report that she has had some f Segun's arch support. If she continues to have issues, may set her up with Dr. Juan Francisco Coburn for evaluation. She and her daughter expressed understanding and agreement.     Sami Castelan MD, [address] Orthopedic Surgery  Phone [phone]

## 2021-08-24 ENCOUNTER — TELEPHONE (OUTPATIENT)
Dept: FAMILY MEDICINE CLINIC | Facility: CLINIC | Age: 81
End: 2021-08-24

## 2021-08-24 NOTE — TELEPHONE ENCOUNTER
Calling back with meds  Ursodiol 300mg 3x daily  Hydrochlorothiazide 25mg 1x daily  Losartan 100mg 1x daily  She has only taken about half of her potassium rx

## 2021-09-23 ENCOUNTER — TELEPHONE (OUTPATIENT)
Dept: FAMILY MEDICINE CLINIC | Facility: CLINIC | Age: 81
End: 2021-09-23

## 2021-09-23 DIAGNOSIS — N18.30 STAGE 3 CHRONIC KIDNEY DISEASE, UNSPECIFIED WHETHER STAGE 3A OR 3B CKD (HCC): ICD-10-CM

## 2021-09-23 DIAGNOSIS — Z00.00 ROUTINE GENERAL MEDICAL EXAMINATION AT HEALTH CARE FACILITY: Primary | ICD-10-CM

## 2021-09-23 DIAGNOSIS — E78.00 ELEVATED CHOLESTEROL: ICD-10-CM

## 2021-09-23 DIAGNOSIS — E55.9 VITAMIN D DEFICIENCY: ICD-10-CM

## 2021-09-23 DIAGNOSIS — E87.6 LOW SERUM POTASSIUM: ICD-10-CM

## 2021-09-23 DIAGNOSIS — R74.8 ELEVATED VITAMIN B12 LEVEL: ICD-10-CM

## 2021-09-23 DIAGNOSIS — I10 ESSENTIAL HYPERTENSION: ICD-10-CM

## 2021-09-29 ENCOUNTER — LAB ENCOUNTER (OUTPATIENT)
Dept: LAB | Age: 81
End: 2021-09-29
Attending: FAMILY MEDICINE
Payer: MEDICARE

## 2021-09-29 DIAGNOSIS — N18.30 STAGE 3 CHRONIC KIDNEY DISEASE, UNSPECIFIED WHETHER STAGE 3A OR 3B CKD (HCC): ICD-10-CM

## 2021-09-29 DIAGNOSIS — I10 ESSENTIAL HYPERTENSION: ICD-10-CM

## 2021-09-29 DIAGNOSIS — E78.00 ELEVATED CHOLESTEROL: ICD-10-CM

## 2021-09-29 DIAGNOSIS — E55.9 VITAMIN D DEFICIENCY: ICD-10-CM

## 2021-09-29 DIAGNOSIS — R74.8 ELEVATED VITAMIN B12 LEVEL: ICD-10-CM

## 2021-09-29 DIAGNOSIS — Z00.00 ROUTINE GENERAL MEDICAL EXAMINATION AT HEALTH CARE FACILITY: ICD-10-CM

## 2021-09-29 DIAGNOSIS — E87.6 LOW SERUM POTASSIUM: ICD-10-CM

## 2021-09-29 PROCEDURE — 84439 ASSAY OF FREE THYROXINE: CPT

## 2021-09-29 PROCEDURE — 85025 COMPLETE CBC W/AUTO DIFF WBC: CPT

## 2021-09-29 PROCEDURE — 80053 COMPREHEN METABOLIC PANEL: CPT

## 2021-09-29 PROCEDURE — 82607 VITAMIN B-12: CPT

## 2021-09-29 PROCEDURE — 36415 COLL VENOUS BLD VENIPUNCTURE: CPT

## 2021-09-29 PROCEDURE — 84481 FREE ASSAY (FT-3): CPT

## 2021-09-29 PROCEDURE — 82306 VITAMIN D 25 HYDROXY: CPT

## 2021-09-29 PROCEDURE — 84443 ASSAY THYROID STIM HORMONE: CPT

## 2021-09-29 PROCEDURE — 80061 LIPID PANEL: CPT

## 2021-09-29 RX ORDER — SPIRONOLACTONE 50 MG/1
TABLET, FILM COATED ORAL
Qty: 30 TABLET | Refills: 0 | Status: SHIPPED | OUTPATIENT
Start: 2021-09-29 | End: 2021-10-15

## 2021-10-01 ENCOUNTER — TELEPHONE (OUTPATIENT)
Dept: FAMILY MEDICINE CLINIC | Facility: CLINIC | Age: 81
End: 2021-10-01

## 2021-10-15 RX ORDER — SPIRONOLACTONE 50 MG/1
TABLET, FILM COATED ORAL
Qty: 30 TABLET | Refills: 0 | Status: SHIPPED | OUTPATIENT
Start: 2021-10-15 | End: 2021-11-01

## 2021-11-01 ENCOUNTER — TELEMEDICINE (OUTPATIENT)
Dept: FAMILY MEDICINE CLINIC | Facility: CLINIC | Age: 81
End: 2021-11-01

## 2021-11-01 VITALS
DIASTOLIC BLOOD PRESSURE: 68 MMHG | SYSTOLIC BLOOD PRESSURE: 124 MMHG | BODY MASS INDEX: 28.86 KG/M2 | HEIGHT: 60 IN | HEART RATE: 70 BPM | WEIGHT: 147 LBS | OXYGEN SATURATION: 96 %

## 2021-11-01 DIAGNOSIS — Z00.00 ENCOUNTER FOR ANNUAL HEALTH EXAMINATION: Primary | ICD-10-CM

## 2021-11-01 DIAGNOSIS — K57.30 DIVERTICULOSIS OF COLON: ICD-10-CM

## 2021-11-01 DIAGNOSIS — Z96.1 BILATERAL PSEUDOPHAKIA: ICD-10-CM

## 2021-11-01 DIAGNOSIS — M54.42 CHRONIC LEFT-SIDED LOW BACK PAIN WITH LEFT-SIDED SCIATICA: ICD-10-CM

## 2021-11-01 DIAGNOSIS — H90.5 CENTRAL HEARING LOSS: ICD-10-CM

## 2021-11-01 DIAGNOSIS — I10 BENIGN ESSENTIAL HYPERTENSION: ICD-10-CM

## 2021-11-01 DIAGNOSIS — H35.3130 BILATERAL NONEXUDATIVE AGE-RELATED MACULAR DEGENERATION, UNSPECIFIED STAGE: ICD-10-CM

## 2021-11-01 DIAGNOSIS — B00.9 HSV INFECTION: ICD-10-CM

## 2021-11-01 DIAGNOSIS — Z85.828 HISTORY OF SKIN CANCER: ICD-10-CM

## 2021-11-01 DIAGNOSIS — E78.00 ELEVATED CHOLESTEROL: ICD-10-CM

## 2021-11-01 DIAGNOSIS — M81.8 OTHER OSTEOPOROSIS WITHOUT CURRENT PATHOLOGICAL FRACTURE: ICD-10-CM

## 2021-11-01 DIAGNOSIS — L71.9 ROSACEA: ICD-10-CM

## 2021-11-01 DIAGNOSIS — K74.3 PRIMARY BILIARY CIRRHOSIS (HCC): ICD-10-CM

## 2021-11-01 DIAGNOSIS — G47.09 OTHER INSOMNIA: ICD-10-CM

## 2021-11-01 DIAGNOSIS — K44.9 DIAPHRAGMATIC HERNIA WITHOUT OBSTRUCTION OR GANGRENE: ICD-10-CM

## 2021-11-01 DIAGNOSIS — Z86.010 HISTORY OF COLON POLYPS: ICD-10-CM

## 2021-11-01 DIAGNOSIS — H04.123 DRY EYES: ICD-10-CM

## 2021-11-01 DIAGNOSIS — G89.29 CHRONIC LEFT-SIDED LOW BACK PAIN WITH LEFT-SIDED SCIATICA: ICD-10-CM

## 2021-11-01 PROBLEM — S32.502D CLOSED NONDISPLACED FRACTURE OF LEFT PUBIS WITH ROUTINE HEALING: Status: RESOLVED | Noted: 2020-10-09 | Resolved: 2021-11-01

## 2021-11-01 PROBLEM — K83.01 PRIMARY SCLEROSING CHOLANGITIS: Status: RESOLVED | Noted: 2018-09-16 | Resolved: 2021-11-01

## 2021-11-01 PROBLEM — D31.32 CHOROIDAL NEVUS OF LEFT EYE: Status: RESOLVED | Noted: 2019-11-04 | Resolved: 2021-11-01

## 2021-11-01 PROBLEM — R29.898 LEFT HAND WEAKNESS: Status: RESOLVED | Noted: 2020-12-01 | Resolved: 2021-11-01

## 2021-11-01 PROBLEM — M54.16 LUMBAR RADICULOPATHY: Status: RESOLVED | Noted: 2020-10-28 | Resolved: 2021-11-01

## 2021-11-01 PROCEDURE — G0439 PPPS, SUBSEQ VISIT: HCPCS | Performed by: FAMILY MEDICINE

## 2021-11-01 PROCEDURE — 99213 OFFICE O/P EST LOW 20 MIN: CPT | Performed by: FAMILY MEDICINE

## 2021-11-01 RX ORDER — LOSARTAN POTASSIUM 100 MG/1
100 TABLET ORAL
Qty: 90 TABLET | Refills: 3 | Status: SHIPPED | OUTPATIENT
Start: 2021-11-01

## 2021-11-01 RX ORDER — ZOLPIDEM TARTRATE 10 MG/1
10 TABLET ORAL NIGHTLY
Qty: 90 TABLET | Refills: 3 | Status: SHIPPED | OUTPATIENT
Start: 2021-11-01

## 2021-11-01 RX ORDER — URSODIOL 300 MG/1
CAPSULE ORAL
Qty: 270 CAPSULE | Refills: 3 | Status: SHIPPED | OUTPATIENT
Start: 2021-11-01

## 2021-11-01 RX ORDER — SPIRONOLACTONE 50 MG/1
50 TABLET, FILM COATED ORAL DAILY
Qty: 90 TABLET | Refills: 3 | Status: SHIPPED | OUTPATIENT
Start: 2021-11-01

## 2021-11-01 NOTE — PATIENT INSTRUCTIONS
Mode Azevedo's SCREENING SCHEDULE   Tests on this list are recommended by your physician but may not be covered, or covered at this frequency, by your insurer. Please check with your insurance carrier before scheduling to verify coverage.    PREVENT 07/24/2019      No recommendations at this time   Pap and Pelvic    Pap   Covered every 2 years for women at normal risk;  Annually if at high risk -  No recommendations at this time    Chlamydia Annually if high risk -  No recommendations at this time   Sc http://www. idph.state. [place]/public/books/advin.htm  A link to the [employer]. This site has a lot of good information including definitions of the different types of Advance Directives.  It also has the State forms available on it's webs

## 2021-11-01 NOTE — PROGRESS NOTES
This visit is conducted using Telemedicine with live, interactive video and audio.     Telehealth outside of [address] Verbal Consent   I conducted a telehealth visit with Greig Spurling today, 11/01/21, which was completed using two-way, real-t cholesterol     History of skin cancer     Other insomnia     Chronic back pain     Other osteoporosis without current pathological fracture     Bilateral nonexudative age-related macular degeneration     Bilateral pseudophakia     Calculus of gallbladder available to patient in AVS     She does NOT have a Power of  for [employer] on file in [place].    Advance care planning including the explanation and discussion of advance directives standard forms performed Face to Face with patient and Family/surr 33 09/29/2021    ALT 46 09/29/2021    CA 9.7 09/29/2021    ALB 3.6 09/29/2021    TSH 5.270 (H) 09/29/2021    CREATSERUM 1.00 09/29/2021    GLU 95 09/29/2021        CBC  (most recent labs)   Lab Results   Component Value Date    WBC 9.9 09/29/2021    HGB 13 never used smokeless tobacco. She reports current alcohol use. She reports that she does not use drugs.      REVIEW OF SYSTEMS:   GENERAL: feels well otherwise  SKIN: denies any unusual skin lesions  EYES: denies blurred vision or double vision  HEENT: sonja Assessment. PLAN SUMMARY:   Diagnoses and all orders for this visit:    Encounter for annual health examination    Benign essential hypertension- stable cmp / labs in 6 weeks   -     spironolactone 50 MG Oral Tab;  Take 1 tablet (50 mg total) by mouth d assessment: good     PLAN:  The patient indicates understanding of these issues and agrees to the plan. Reinforced healthy diet, lifestyle, and exercise. Return in 1 year (on 11/1/2022).      Aleisha Fitzgerald DO, 11/1/2021     General Health     In the [facility] • Anyone with a family history -     Colorectal Cancer Screening  Covered for ages 52-80; only need ONE of the following:    Colonoscopy   Covered every 10 years    Covered every 2 years if patient is at high risk or previous colonoscopy was abnormal - Persistent Medications (ACE/ARB, digoxin diuretics, anticonvulsants)    Potassium Annually Lab Results   Component Value Date    K 4.0 09/29/2021         Creatinine   Annually Lab Results   Component Value Date    CREATSERUM 1.00 09/29/2021         BUN Kristen Childress

## 2021-11-09 ENCOUNTER — PATIENT MESSAGE (OUTPATIENT)
Dept: FAMILY MEDICINE CLINIC | Facility: CLINIC | Age: 81
End: 2021-11-09

## 2021-11-09 NOTE — TELEPHONE ENCOUNTER
From: Layo Green  To: Rashida Castanon DO  Sent: 11/9/2021 8:58 AM CST  Subject: Access to Medical Records    I would like to make sure my 3 daughters have access to my medical records,  so they are able to speak to your office regarding my medical care

## 2022-01-26 ENCOUNTER — PATIENT MESSAGE (OUTPATIENT)
Dept: FAMILY MEDICINE CLINIC | Facility: CLINIC | Age: 82
End: 2022-01-26

## 2022-01-26 DIAGNOSIS — R30.0 DYSURIA: Primary | ICD-10-CM

## 2022-01-26 RX ORDER — CIPROFLOXACIN 250 MG/1
250 TABLET, FILM COATED ORAL 2 TIMES DAILY
Qty: 20 TABLET | Refills: 0 | Status: SHIPPED | OUTPATIENT
Start: 2022-01-26 | End: 2022-02-05

## 2022-01-26 NOTE — TELEPHONE ENCOUNTER
Dr Marti Story, pt daughter states pt is now throwing up too. Advised that pt should be seen at urgent care if she has urinary sx and vomiting. Please see messages and advise if you'd like to do anything else at this time.

## 2022-01-26 NOTE — TELEPHONE ENCOUNTER
Dr Humberto Morgan, ok for UA and Cx or would you like to send something or do VV?     Pt last had VV on 11/1/21

## 2022-01-26 NOTE — TELEPHONE ENCOUNTER
From: Kerry Mari  To: Jose Mehta DO  Sent: 1/26/2022 7:33 AM CST  Subject: UTI - Confirmed    Good morning Dr. Herminia Ward for my mom, Thierry Rojas). Mom has a UTI as confirmed with AZO UTI Home Tests.  She had been waking  up to [facility]

## 2022-01-27 NOTE — TELEPHONE ENCOUNTER
Ideally pt should have a ua and ucx to ensure a UTI is present to avoid cdiff infection     UA / UCX order placed along with abx   Take with probiotic     If still with n/v- ER eval recommended

## 2022-05-27 ENCOUNTER — TELEPHONE (OUTPATIENT)
Dept: PAIN CLINIC | Facility: CLINIC | Age: 82
End: 2022-05-27

## 2022-05-27 NOTE — TELEPHONE ENCOUNTER
Ps daughter called requesting to schedule injection. Radu Emmanuel asked when was her last injection. PSR advised Radu Emmanuel 7/1/2021. PSR attempted to schedule appt with MD for eval as LOV was on 10/28/2020. Radu Emmanuel declined stated pt lives 1.5 hrs away. PSR advised Radu Emmanuel approval for a video visit will be required from MD.     Please advise if video appt can be offered or if MD will place order.

## 2022-05-27 NOTE — TELEPHONE ENCOUNTER
Andrei Valverde MD  You 11 minutes ago (11:00 AM)     Happy to do video visit with her. Sukhjinder Bello MD 19 minutes ago (10:52 AM)     LOV was 10/28/2020. Please advise if re-eval is necessary and if this can be done via phone or video. Thanks!     Routing comment

## 2022-06-15 ENCOUNTER — TELEPHONE (OUTPATIENT)
Dept: PAIN CLINIC | Facility: CLINIC | Age: 82
End: 2022-06-15

## 2022-06-15 ENCOUNTER — OFFICE VISIT (OUTPATIENT)
Dept: PAIN CLINIC | Facility: CLINIC | Age: 82
End: 2022-06-15
Payer: MEDICARE

## 2022-06-15 VITALS
DIASTOLIC BLOOD PRESSURE: 82 MMHG | BODY MASS INDEX: 29 KG/M2 | SYSTOLIC BLOOD PRESSURE: 126 MMHG | WEIGHT: 147 LBS | OXYGEN SATURATION: 98 % | HEART RATE: 70 BPM

## 2022-06-15 DIAGNOSIS — M54.42 CHRONIC LEFT-SIDED LOW BACK PAIN WITH LEFT-SIDED SCIATICA: ICD-10-CM

## 2022-06-15 DIAGNOSIS — M54.16 LUMBAR RADICULOPATHY: Primary | ICD-10-CM

## 2022-06-15 DIAGNOSIS — G89.29 CHRONIC LEFT-SIDED LOW BACK PAIN WITH LEFT-SIDED SCIATICA: ICD-10-CM

## 2022-06-15 PROCEDURE — 99214 OFFICE O/P EST MOD 30 MIN: CPT | Performed by: ANESTHESIOLOGY

## 2022-06-15 NOTE — PROGRESS NOTES
Patient presents in office today with reported pain in low back    Current pain level reported = 9/10    Last reported dose of Advil yesterday      Narcotic Contract renewal na    Urine Drug screen na

## 2022-06-21 ENCOUNTER — HOSPITAL ENCOUNTER (OUTPATIENT)
Facility: HOSPITAL | Age: 82
Setting detail: HOSPITAL OUTPATIENT SURGERY
Discharge: HOME | End: 2022-06-21
Attending: ANESTHESIOLOGY | Admitting: ANESTHESIOLOGY
Payer: MEDICARE

## 2022-06-21 ENCOUNTER — APPOINTMENT (OUTPATIENT)
Dept: GENERAL RADIOLOGY | Facility: HOSPITAL | Age: 82
End: 2022-06-21
Attending: ANESTHESIOLOGY
Payer: MEDICARE

## 2022-06-21 VITALS
HEART RATE: 77 BPM | SYSTOLIC BLOOD PRESSURE: 130 MMHG | OXYGEN SATURATION: 100 % | WEIGHT: 147 LBS | HEIGHT: 60 IN | RESPIRATION RATE: 18 BRPM | TEMPERATURE: 98 F | BODY MASS INDEX: 28.86 KG/M2 | DIASTOLIC BLOOD PRESSURE: 59 MMHG

## 2022-06-21 DIAGNOSIS — M54.16 LUMBAR RADICULOPATHY: ICD-10-CM

## 2022-06-21 PROCEDURE — 64483 NJX AA&/STRD TFRM EPI L/S 1: CPT | Performed by: ANESTHESIOLOGY

## 2022-06-21 PROCEDURE — 3E0R3BZ INTRODUCTION OF ANESTHETIC AGENT INTO SPINAL CANAL, PERCUTANEOUS APPROACH: ICD-10-PCS | Performed by: ANESTHESIOLOGY

## 2022-06-21 PROCEDURE — 64484 NJX AA&/STRD TFRM EPI L/S EA: CPT | Performed by: ANESTHESIOLOGY

## 2022-06-21 PROCEDURE — 3E0R33Z INTRODUCTION OF ANTI-INFLAMMATORY INTO SPINAL CANAL, PERCUTANEOUS APPROACH: ICD-10-PCS | Performed by: ANESTHESIOLOGY

## 2022-06-21 RX ORDER — DEXAMETHASONE SODIUM PHOSPHATE 10 MG/ML
INJECTION, SOLUTION INTRAMUSCULAR; INTRAVENOUS
Status: DISCONTINUED | OUTPATIENT
Start: 2022-06-21 | End: 2022-06-21

## 2022-06-21 RX ORDER — LIDOCAINE HYDROCHLORIDE 10 MG/ML
INJECTION, SOLUTION EPIDURAL; INFILTRATION; INTRACAUDAL; PERINEURAL
Status: DISCONTINUED | OUTPATIENT
Start: 2022-06-21 | End: 2022-06-21

## 2022-06-21 RX ORDER — SODIUM CHLORIDE 9 MG/ML
INJECTION INTRAVENOUS
Status: DISCONTINUED | OUTPATIENT
Start: 2022-06-21 | End: 2022-06-21

## 2022-06-21 NOTE — OPERATIVE REPORT
[facility]  Operative Report  2022     Lilia Barakat Patient Status:  Hospital Outpatient Surgery    1/3/1940 MRN [id number]   Location [address] [address] Attending Rubin Jay MD   [address] [facility] [address] Day # 0 PCP Daniel Hutchinson DO     Indication: Mauricio Church is a 80year old female with lumbar radiculopathy    Imaging: Patient's prior lumbar MRI reviewed    Preoperative Diagnosis:  Lumbar radiculopathy [M54.16]    Postoperative Diagnosis: Same as above. Procedure performed: LEFT LUMBAR 4-5, LUMBAR 5-SACRAL 1 TRANSFORAMINAL EPIDURAL STEROID INJECTION MULTIPLE LEVEL with local      Anesthesia: Local     EBL: Less than 1 ml. Procedure Description:  After reviewing the patient's history and performing a focused physical examination, the diagnosis was confirmed and contraindications such as infection and coagulopathy were ruled out. Following review of potential side effects and complications, including but not necessarily limited to infection, allergic reaction, local tissue breakdown, nerve injury, and paresis, the patient indicated they understood and agreed to proceed. After obtaining the informed consent, the patient was brought to the procedure room and monitored. In the prone position, following sterile prep and drape of the lumbar region,  the  L4 neural foramen was identified under fluoroscopy. The skin and subcutaneous tissue was anesthetized via 25-gauge 1.5\" needle with approximately 2 cc of 1% lidocaine. A 22-gauge 5\" Quincke spinal needle was introduced toward the inferior aspect of the junction between the transverse process and pedicle of the  L4 level atraumatically under fluoroscopic guidance. The needle was advanced into the anterior epidural space at this level. The needle position was confirmed under AP and lateral fluoroscopic view. Following negative aspiration for CSF and blood, approximately 1 cc of Omnipaque 240 was injected.   An excellent contrast spread along the epidural space and the nerve root was obtained. At this point, 1cc of NS with 5 mg of dexamethasone was injected without complication. The needle was withdrawn with stylet in situ after being flushed with 1 cc PF lidocaine. The  L5 neural foramen was also identified under fluoroscopy. The skin and subcutaneous tissue was anesthetized via 25-gauge 1.5\" needle with approximately 2 cc of 1% lidocaine. A 22-gauge 5\" Quincke spinal needle was introduced toward the inferior aspect of the junction between the transverse process and pedicle of the L5 level atraumatically under fluoroscopic guidance. The needle was advanced into the anterior epidural space at this level. The needle position was confirmed under AP and lateral fluoroscopic view. Following negative aspiration for CSF and blood, approximately 1 cc of Omnipaque 240 was injected. An excellent contrast spread along the epidural space and the nerve root was obtained. At this point, 1cc of NS with 5 mg of dexamethasone was injected without complication. The needle was withdrawn with stylet in situ after being flushed with 1 cc PF lidocaine. .  The patient tolerated procedure very well. The patient was observed until discharge criteria met. Discharge instructions were given and patient was released to a responsible adult. Complications: None. Follow up: The patient was followed in the pain clinic as needed basis.         Tara Hopper MD

## 2022-06-23 ENCOUNTER — PATIENT MESSAGE (OUTPATIENT)
Dept: FAMILY MEDICINE CLINIC | Facility: CLINIC | Age: 82
End: 2022-06-23

## 2022-06-23 NOTE — TELEPHONE ENCOUNTER
From: Jayro Arroyo  To: Rajendra Zurita DO  Sent: 6/23/2022 6:09 AM CDT  Subject: Sodium Check    It's Will Edwards of behalf of my mom, Juany Patino. Dr. Barbara Sánchez, can you please order blood work to check sodium levels at this time?

## 2022-07-05 NOTE — TELEPHONE ENCOUNTER
From: Eileen Bales  To: Jade Carey DO  Sent: 1/24/2021 6:35 AM CST  Subject: Other    When will I be able to make an appointment for a Covid 19 vaccine?
05-Jul-2022 19:18

## 2022-07-09 ENCOUNTER — PATIENT MESSAGE (OUTPATIENT)
Dept: FAMILY MEDICINE CLINIC | Facility: CLINIC | Age: 82
End: 2022-07-09

## 2022-07-11 ENCOUNTER — OFFICE VISIT (OUTPATIENT)
Dept: PAIN CLINIC | Facility: CLINIC | Age: 82
End: 2022-07-11
Payer: MEDICARE

## 2022-07-11 VITALS
DIASTOLIC BLOOD PRESSURE: 82 MMHG | HEIGHT: 60 IN | HEART RATE: 78 BPM | SYSTOLIC BLOOD PRESSURE: 130 MMHG | WEIGHT: 147 LBS | RESPIRATION RATE: 16 BRPM | OXYGEN SATURATION: 98 % | BODY MASS INDEX: 28.86 KG/M2

## 2022-07-11 DIAGNOSIS — M54.42 CHRONIC LEFT-SIDED LOW BACK PAIN WITH LEFT-SIDED SCIATICA: Primary | ICD-10-CM

## 2022-07-11 DIAGNOSIS — G89.29 CHRONIC LEFT-SIDED LOW BACK PAIN WITH LEFT-SIDED SCIATICA: Primary | ICD-10-CM

## 2022-07-11 PROCEDURE — 99214 OFFICE O/P EST MOD 30 MIN: CPT | Performed by: ANESTHESIOLOGY

## 2022-07-11 PROCEDURE — 1125F AMNT PAIN NOTED PAIN PRSNT: CPT | Performed by: ANESTHESIOLOGY

## 2022-07-11 NOTE — PROGRESS NOTES
Last procedure: LEFT LUMBAR 4-5, LUMBAR 5-SACRAL 1 TRANSFORAMINAL EPIDURAL STEROID INJECTION MULTIPLE LEVEL with local        Date: 06/21/2022    Percentage of relief obtained: 30%    Duration of relief: still ongoing    Current Pain Score: 6/10

## 2022-07-11 NOTE — TELEPHONE ENCOUNTER
From: Elli Youngblood  To: Igor Ashby DO  Sent: 7/9/2022 10:10 AM CDT  Subject: Blood Pressure Meds    Can you tell me what date my blood pressure medication was FIRST prescribed? (Medication & Diuretic) jThank you.

## 2023-01-20 ENCOUNTER — TELEPHONE (OUTPATIENT)
Dept: PAIN CLINIC | Facility: CLINIC | Age: 83
End: 2023-01-20

## 2023-01-20 DIAGNOSIS — M54.16 LUMBAR RADICULOPATHY: Primary | ICD-10-CM

## 2023-01-20 NOTE — TELEPHONE ENCOUNTER
Pt would like to get another injection was told at her last office visit she can just call. She was last seen on 07/11/22.

## 2023-01-20 NOTE — TELEPHONE ENCOUNTER
Question Answer   Anesthesia Type Local   Provider [facility]   Procedure Transforaminal   Laterality/Level left L4-5, L5-S1   Medical clearance requested (will send to Pain Navigator) No   Patient has Medicare coverage?  Yes   Comments (Please list entire procedure name here.) left lumbar 4-5, lumbar 5-sacral 1 transforaminal epidural steroid injection

## (undated) DEVICE — GLOVE SURG SENSICARE SZ 7-1/2

## (undated) DEVICE — BANDAID COVERLET 1X3

## (undated) DEVICE — GLOVE SURG SENSICARE SZ 6-1/2

## (undated) DEVICE — NEEDLE SPINAL 22X3-1/2 BLK

## (undated) DEVICE — REMOVER PREP SOLUTION 4OZ

## (undated) DEVICE — PAIN TRAY: Brand: MEDLINE INDUSTRIES, INC.

## (undated) DEVICE — GLOVE SURG SENSICARE SZ 7

## (undated) DEVICE — BANDAGE ADH COVERLET 3X1IN

## (undated) DEVICE — NEEDLE SPINAL 22X5 405148

## (undated) DEVICE — REMOVER DURAPREP 3M

## (undated) NOTE — LETTER
10/15/18        [facility]      Dear Margoth Chavez,    Our records indicate that you have outstanding lab work and or testing that was ordered for you and has not yet been completed:  Orders Placed This Encounter

## (undated) NOTE — LETTER
01/28/19        Eileen Lewis      Dear Monty Bradford,    Our records indicate that you have outstanding lab work and or testing that was ordered for you and has not yet been completed:  Orders Placed This Encounter

## (undated) NOTE — LETTER
12/12/17        Chau Lewis      Dear Wynette Felty,    Our records indicate that you have outstanding lab work and or testing that was ordered for you and has not yet been completed:          Lipid Panel      [facility] [place]

## (undated) NOTE — LETTER
12/09/20        Greig Spurling  [address] [place] [id number]      Dear Jarrod Craig,    Our records indicate that you have outstanding lab work and or testing that was ordered for you and has not yet been completed:  Orders Placed This Encounter

## (undated) NOTE — LETTER
11/04/20        Nancy Perry  [address] [place] [id number]      Dear Debbie Mg,    Our records indicate that you have outstanding lab work and or testing that was ordered for you and has not yet been completed:  Orders Placed This Encounter

## (undated) NOTE — LETTER
07/30/20        Angeles Griffith  [address] [place] [id number]      Dear Kirill Raines,    Our records indicate that you have outstanding lab work and or testing that was ordered for you and has not yet been completed:  Orders Placed This Encounter

## (undated) NOTE — LETTER
[address] [facility] in [address]  [phone]        March 6, 2018      Antoinette Finley  [facility]      Dear Emanuel Edwards:    Your mammogram test result is normal.  Lj jamison

## (undated) NOTE — LETTER
Angelina Mahan  [facility]      [id number]      July 26, 2019    Dear Ms. Azevedo,    I have recently reviewed your mammogram report from radiology.  I am happy to see that the report was read as normal.    Our radiology department has